# Patient Record
Sex: MALE | Race: WHITE | NOT HISPANIC OR LATINO | Employment: OTHER | URBAN - METROPOLITAN AREA
[De-identification: names, ages, dates, MRNs, and addresses within clinical notes are randomized per-mention and may not be internally consistent; named-entity substitution may affect disease eponyms.]

---

## 2022-01-05 ENCOUNTER — HOSPITAL ENCOUNTER (INPATIENT)
Facility: HOSPITAL | Age: 67
LOS: 2 days | Discharge: HOME | DRG: 308 | End: 2022-01-08
Attending: EMERGENCY MEDICINE | Admitting: INTERNAL MEDICINE
Payer: MEDICARE

## 2022-01-05 DIAGNOSIS — I48.91 ATRIAL FIBRILLATION WITH RVR: Primary | ICD-10-CM

## 2022-01-05 DIAGNOSIS — R07.9 CHEST PAIN: ICD-10-CM

## 2022-01-05 PROBLEM — U07.1 COVID-19: Status: ACTIVE | Noted: 2022-01-05

## 2022-01-05 PROBLEM — I10 PRIMARY HYPERTENSION: Status: ACTIVE | Noted: 2022-01-05

## 2022-01-05 LAB
ALBUMIN SERPL BCP-MCNC: 4.4 G/DL (ref 3.5–5.2)
ALP SERPL-CCNC: 54 U/L (ref 55–135)
ALT SERPL W/O P-5'-P-CCNC: 26 U/L (ref 10–44)
ANION GAP SERPL CALC-SCNC: 13 MMOL/L (ref 8–16)
AST SERPL-CCNC: 31 U/L (ref 10–40)
BASOPHILS # BLD AUTO: 0.02 K/UL (ref 0–0.2)
BASOPHILS NFR BLD: 0.4 % (ref 0–1.9)
BILIRUB SERPL-MCNC: 0.8 MG/DL (ref 0.1–1)
BNP SERPL-MCNC: 231 PG/ML (ref 0–99)
BUN SERPL-MCNC: 16 MG/DL (ref 8–23)
CALCIUM SERPL-MCNC: 9.1 MG/DL (ref 8.7–10.5)
CHLORIDE SERPL-SCNC: 100 MMOL/L (ref 95–110)
CK MB SERPL-MCNC: 1.3 NG/ML (ref 0.1–6.5)
CK SERPL-CCNC: 56 U/L (ref 20–200)
CO2 SERPL-SCNC: 25 MMOL/L (ref 23–29)
CREAT SERPL-MCNC: 1 MG/DL (ref 0.5–1.4)
D DIMER PPP IA.FEU-MCNC: 0.52 UG/ML FEU
DIFFERENTIAL METHOD: ABNORMAL
EOSINOPHIL # BLD AUTO: 0.1 K/UL (ref 0–0.5)
EOSINOPHIL NFR BLD: 1 % (ref 0–8)
ERYTHROCYTE [DISTWIDTH] IN BLOOD BY AUTOMATED COUNT: 12.1 % (ref 11.5–14.5)
EST. GFR  (AFRICAN AMERICAN): >60 ML/MIN/1.73 M^2
EST. GFR  (NON AFRICAN AMERICAN): >60 ML/MIN/1.73 M^2
GLUCOSE SERPL-MCNC: 124 MG/DL (ref 70–110)
HCT VFR BLD AUTO: 47.8 % (ref 40–54)
HGB BLD-MCNC: 16.2 G/DL (ref 14–18)
IMM GRANULOCYTES # BLD AUTO: 0.01 K/UL (ref 0–0.04)
IMM GRANULOCYTES NFR BLD AUTO: 0.2 % (ref 0–0.5)
INR PPP: 1.1
LYMPHOCYTES # BLD AUTO: 1 K/UL (ref 1–4.8)
LYMPHOCYTES NFR BLD: 20.9 % (ref 18–48)
MAGNESIUM SERPL-MCNC: 2 MG/DL (ref 1.6–2.6)
MCH RBC QN AUTO: 32.1 PG (ref 27–31)
MCHC RBC AUTO-ENTMCNC: 33.9 G/DL (ref 32–36)
MCV RBC AUTO: 95 FL (ref 82–98)
MONOCYTES # BLD AUTO: 0.7 K/UL (ref 0.3–1)
MONOCYTES NFR BLD: 13.3 % (ref 4–15)
NEUTROPHILS # BLD AUTO: 3.1 K/UL (ref 1.8–7.7)
NEUTROPHILS NFR BLD: 64.2 % (ref 38–73)
NRBC BLD-RTO: 0 /100 WBC
PLATELET # BLD AUTO: 185 K/UL (ref 150–450)
PMV BLD AUTO: 9.2 FL (ref 9.2–12.9)
POTASSIUM SERPL-SCNC: 3.9 MMOL/L (ref 3.5–5.1)
PROT SERPL-MCNC: 7.8 G/DL (ref 6–8.4)
PROTHROMBIN TIME: 13.1 SEC (ref 11.4–13.7)
RBC # BLD AUTO: 5.04 M/UL (ref 4.6–6.2)
SARS-COV-2 RDRP RESP QL NAA+PROBE: POSITIVE
SODIUM SERPL-SCNC: 138 MMOL/L (ref 136–145)
TROPONIN I SERPL DL<=0.01 NG/ML-MCNC: <0.03 NG/ML
TSH SERPL DL<=0.005 MIU/L-ACNC: 1.23 UIU/ML (ref 0.34–5.6)
WBC # BLD AUTO: 4.89 K/UL (ref 3.9–12.7)

## 2022-01-05 PROCEDURE — 96372 THER/PROPH/DIAG INJ SC/IM: CPT | Mod: 59

## 2022-01-05 PROCEDURE — 82550 ASSAY OF CK (CPK): CPT | Performed by: EMERGENCY MEDICINE

## 2022-01-05 PROCEDURE — 84443 ASSAY THYROID STIM HORMONE: CPT | Performed by: INTERNAL MEDICINE

## 2022-01-05 PROCEDURE — G0378 HOSPITAL OBSERVATION PER HR: HCPCS

## 2022-01-05 PROCEDURE — 83735 ASSAY OF MAGNESIUM: CPT | Performed by: EMERGENCY MEDICINE

## 2022-01-05 PROCEDURE — 96376 TX/PRO/DX INJ SAME DRUG ADON: CPT

## 2022-01-05 PROCEDURE — 84484 ASSAY OF TROPONIN QUANT: CPT | Performed by: STUDENT IN AN ORGANIZED HEALTH CARE EDUCATION/TRAINING PROGRAM

## 2022-01-05 PROCEDURE — 85379 FIBRIN DEGRADATION QUANT: CPT | Performed by: EMERGENCY MEDICINE

## 2022-01-05 PROCEDURE — 85610 PROTHROMBIN TIME: CPT | Performed by: STUDENT IN AN ORGANIZED HEALTH CARE EDUCATION/TRAINING PROGRAM

## 2022-01-05 PROCEDURE — 83880 ASSAY OF NATRIURETIC PEPTIDE: CPT | Performed by: STUDENT IN AN ORGANIZED HEALTH CARE EDUCATION/TRAINING PROGRAM

## 2022-01-05 PROCEDURE — 93010 EKG 12-LEAD: ICD-10-PCS | Mod: ,,, | Performed by: INTERNAL MEDICINE

## 2022-01-05 PROCEDURE — 96366 THER/PROPH/DIAG IV INF ADDON: CPT

## 2022-01-05 PROCEDURE — 99291 CRITICAL CARE FIRST HOUR: CPT

## 2022-01-05 PROCEDURE — 82553 CREATINE MB FRACTION: CPT | Performed by: EMERGENCY MEDICINE

## 2022-01-05 PROCEDURE — 93005 ELECTROCARDIOGRAM TRACING: CPT | Performed by: INTERNAL MEDICINE

## 2022-01-05 PROCEDURE — 63600175 PHARM REV CODE 636 W HCPCS: Performed by: EMERGENCY MEDICINE

## 2022-01-05 PROCEDURE — 25500020 PHARM REV CODE 255: Performed by: EMERGENCY MEDICINE

## 2022-01-05 PROCEDURE — 96365 THER/PROPH/DIAG IV INF INIT: CPT

## 2022-01-05 PROCEDURE — 85025 COMPLETE CBC W/AUTO DIFF WBC: CPT | Performed by: STUDENT IN AN ORGANIZED HEALTH CARE EDUCATION/TRAINING PROGRAM

## 2022-01-05 PROCEDURE — U0002 COVID-19 LAB TEST NON-CDC: HCPCS | Performed by: EMERGENCY MEDICINE

## 2022-01-05 PROCEDURE — 93010 ELECTROCARDIOGRAM REPORT: CPT | Mod: ,,, | Performed by: INTERNAL MEDICINE

## 2022-01-05 PROCEDURE — 96374 THER/PROPH/DIAG INJ IV PUSH: CPT

## 2022-01-05 PROCEDURE — 25000003 PHARM REV CODE 250: Performed by: EMERGENCY MEDICINE

## 2022-01-05 PROCEDURE — 80053 COMPREHEN METABOLIC PANEL: CPT | Performed by: STUDENT IN AN ORGANIZED HEALTH CARE EDUCATION/TRAINING PROGRAM

## 2022-01-05 PROCEDURE — 36415 COLL VENOUS BLD VENIPUNCTURE: CPT | Performed by: INTERNAL MEDICINE

## 2022-01-05 RX ORDER — POTASSIUM CHLORIDE 20 MEQ/1
40 TABLET, EXTENDED RELEASE ORAL
Status: DISCONTINUED | OUTPATIENT
Start: 2022-01-05 | End: 2022-01-08 | Stop reason: HOSPADM

## 2022-01-05 RX ORDER — POTASSIUM CHLORIDE 7.45 MG/ML
20 INJECTION INTRAVENOUS
Status: DISCONTINUED | OUTPATIENT
Start: 2022-01-05 | End: 2022-01-08 | Stop reason: HOSPADM

## 2022-01-05 RX ORDER — POTASSIUM CHLORIDE 7.45 MG/ML
40 INJECTION INTRAVENOUS
Status: DISCONTINUED | OUTPATIENT
Start: 2022-01-05 | End: 2022-01-08 | Stop reason: HOSPADM

## 2022-01-05 RX ORDER — ENOXAPARIN SODIUM 100 MG/ML
1 INJECTION SUBCUTANEOUS
Status: COMPLETED | OUTPATIENT
Start: 2022-01-05 | End: 2022-01-05

## 2022-01-05 RX ORDER — POTASSIUM CHLORIDE 20 MEQ/1
20 TABLET, EXTENDED RELEASE ORAL
Status: DISCONTINUED | OUTPATIENT
Start: 2022-01-05 | End: 2022-01-08 | Stop reason: HOSPADM

## 2022-01-05 RX ORDER — LANOLIN ALCOHOL/MO/W.PET/CERES
800 CREAM (GRAM) TOPICAL
Status: DISCONTINUED | OUTPATIENT
Start: 2022-01-05 | End: 2022-01-08 | Stop reason: HOSPADM

## 2022-01-05 RX ORDER — MAGNESIUM SULFATE 1 G/100ML
1 INJECTION INTRAVENOUS
Status: DISCONTINUED | OUTPATIENT
Start: 2022-01-05 | End: 2022-01-08 | Stop reason: HOSPADM

## 2022-01-05 RX ORDER — MAGNESIUM SULFATE HEPTAHYDRATE 40 MG/ML
2 INJECTION, SOLUTION INTRAVENOUS
Status: DISCONTINUED | OUTPATIENT
Start: 2022-01-05 | End: 2022-01-08 | Stop reason: HOSPADM

## 2022-01-05 RX ORDER — MAGNESIUM SULFATE HEPTAHYDRATE 40 MG/ML
4 INJECTION, SOLUTION INTRAVENOUS
Status: DISCONTINUED | OUTPATIENT
Start: 2022-01-05 | End: 2022-01-08 | Stop reason: HOSPADM

## 2022-01-05 RX ORDER — DILTIAZEM HYDROCHLORIDE 5 MG/ML
10 INJECTION INTRAVENOUS
Status: COMPLETED | OUTPATIENT
Start: 2022-01-05 | End: 2022-01-05

## 2022-01-05 RX ADMIN — ENOXAPARIN SODIUM 70 MG: 80 INJECTION SUBCUTANEOUS at 03:01

## 2022-01-05 RX ADMIN — IOHEXOL 70 ML: 350 INJECTION, SOLUTION INTRAVENOUS at 05:01

## 2022-01-05 RX ADMIN — DILTIAZEM HYDROCHLORIDE 10 MG: 5 INJECTION INTRAVENOUS at 03:01

## 2022-01-05 RX ADMIN — DEXTROSE MONOHYDRATE 5 MG/HR: 50 INJECTION, SOLUTION INTRAVENOUS at 03:01

## 2022-01-05 NOTE — ED PROVIDER NOTES
Encounter Date: 1/5/2022       History     Chief Complaint   Patient presents with    Palpitations     Pt having palpitations, sent from urgent care     66-year-old male with history of hypertension.  Patient presents emergency department with complaint of COVID like symptoms since 12/31.  Patient states has had nonproductive cough, headache, diarrhea with associated shortness of breath.  Patient was seen at [facility] urgent care earlier today and had tested positive for COVID.  While at urgent care patient found to have atrial fibrillation rapid ventricular response with rates in the 170s was told to come to the emergency department further evaluation treatment.  Patient denied chest pain, no nausea, vomiting, no other constitutional symptoms.        Review of patient's allergies indicates:  No Known Allergies  No past medical history on file.  No past surgical history on file.  No family history on file.     Review of Systems   Constitutional: Positive for fatigue and fever.   HENT: Negative for sore throat.    Respiratory: Positive for cough and shortness of breath.    Cardiovascular: Positive for palpitations. Negative for chest pain.   Gastrointestinal: Negative for nausea and vomiting.   Genitourinary: Negative for dysuria.   Musculoskeletal: Negative for arthralgias, back pain and myalgias.   Skin: Negative for rash.   Neurological: Positive for headaches. Negative for weakness.   Hematological: Does not bruise/bleed easily.       Physical Exam     Initial Vitals [01/05/22 1458]   BP Pulse Resp Temp SpO2   (!) 141/112 (!) 166 18 98.4 °F (36.9 °C) 98 %      MAP       --         Physical Exam    Nursing note and vitals reviewed.  Constitutional: He appears well-developed and well-nourished.   HENT:   Head: Normocephalic and atraumatic.   Nose: Nose normal.   Mouth/Throat: Oropharynx is clear and moist.   Eyes: Conjunctivae and EOM are normal. Pupils are equal, round, and reactive to light. No scleral icterus.    Neck: Neck supple.   Normal range of motion.  Cardiovascular: Regular rhythm, normal heart sounds and intact distal pulses. Exam reveals no gallop and no friction rub.    No murmur heard.  Tachycardia, irregular, irregular   Pulmonary/Chest: No stridor. No respiratory distress.   Course bilateral breath sounds no adventitious sounds   Abdominal: Abdomen is soft. Bowel sounds are normal. He exhibits no mass. There is no abdominal tenderness. There is no rebound and no guarding.   Musculoskeletal:         General: No edema. Normal range of motion.      Cervical back: Normal range of motion and neck supple.     Lymphadenopathy:     He has no cervical adenopathy.   Neurological: He is alert and oriented to person, place, and time. He has normal strength and normal reflexes. No cranial nerve deficit or sensory deficit. GCS score is 15. GCS eye subscore is 4. GCS verbal subscore is 5. GCS motor subscore is 6.   Skin: Skin is warm and dry. Capillary refill takes less than 2 seconds. No rash noted.   Psychiatric: He has a normal mood and affect. His behavior is normal. Judgment and thought content normal.         ED Course   Procedures  Labs Reviewed   CBC W/ AUTO DIFFERENTIAL - Abnormal; Notable for the following components:       Result Value    MCH 32.1 (*)     All other components within normal limits   COMPREHENSIVE METABOLIC PANEL - Abnormal; Notable for the following components:    Glucose 124 (*)     Alkaline Phosphatase 54 (*)     All other components within normal limits   B-TYPE NATRIURETIC PEPTIDE - Abnormal; Notable for the following components:     (*)     All other components within normal limits   SARS-COV-2 RNA AMPLIFICATION, QUAL - Abnormal; Notable for the following components:    SARS-CoV-2 RNA, Amplification, Qual Positive (*)     All other components within normal limits   D DIMER, QUANTITATIVE - Abnormal; Notable for the following components:    D-Dimer 0.52 (*)     All other components  within normal limits    Narrative:     ddimt critical result(s) repeated. Called and verbal readback   obtained from Jessica Forman RN by LS1 01/05/2022 16:07   TROPONIN I   PROTIME-INR   CK   CK-MB   MAGNESIUM   TSH   TSH        ECG Results          EKG 12-lead (In process)  Result time 01/05/22 16:32:21    In process by Interface, Lab In [facility] (01/05/22 16:32:21)                 Narrative:    Test Reason : R07.9,    Vent. Rate : 167 BPM     Atrial Rate : 000 BPM     P-R Int : 000 ms          QRS Dur : 074 ms      QT Int : 246 ms       P-R-T Axes : 000 085 -16 degrees     QTc Int : 410 ms    Atrial fibrillation with rapid ventricular response  Cannot rule out Anterior infarct ,age undetermined  Abnormal ECG  No previous ECGs available    Referred By: [email or web address]   SELF           Confirmed By:                             Imaging Results          CTA Chest Non-Coronary (PE Study) (Final result)  Result time 01/05/22 18:19:36    Final result by Umer Sam MD (01/05/22 18:19:36)                 Narrative:    CMS MANDATED QUALITY DATA - CT RADIATION - 436    All CT scans at this facility utilize dose modulation, iterative reconstruction, and/or weight based dosing when appropriate to reduce radiation dose to as low as reasonably achievable.        Reason: Pulmonary embolism (PE) suspected, positive D-dimer Elevated D-Dimer, SOB, COVID+    TECHNIQUE: CT angiography of thorax with 70 mL Omnipaque 350.  Maximum intensity projection coronal reformations were created at a separate workstation and stored in the patient's permanent medical record.    COMPARISON: None    CTA THORAX:  Negative for pulmonary embolus.    Heart is slightly enlarged with coronary artery calcifications. Thoracic aorta is of normal caliber and without dissection.    Minimal patchy centrilobular groundglass opacity occurs in right lower lobe (for example series 4 image 151). Lungs are otherwise clear. Trachea and main bronchi are patent. No pleural  or pericardial effusion. No enlarged hilar, mediastinal, or axillary lymph nodes.    Visualized upper abdomen is unremarkable.    Degenerative changes affect the spine. No acute osseous abnormality.    IMPRESSION:    1. Negative for pulmonary embolus.  2. Coronary artery calcification and mild cardiomegaly.  3. Minimal patchy centrilobular groundglass opacity in right right lower lobe focally could represent minimal infectious or inflammatory bronchiolitis or alveolitis. Given history, this could reflect minimal parenchymal change related to reported Covid positivity.    Electronically signed by:  Umer Sam MD  1/5/2022 6:19 PM CST Workstation: [id number]                             X-Ray Chest AP Portable (Final result)  Result time 01/05/22 15:47:10    Final result by Eufemia Waters MD (01/05/22 15:47:10)                 Narrative:    XR CHEST 1 VIEW    CLINICAL HISTORY:  66 years Male PALPITATIONS    COMPARISON: None    FINDINGS: Cardiomediastinal silhouette is within normal limits. Lungs are normally expanded with no airspace consolidation. No pleural effusion or pneumothorax. No acute osseous abnormality.    IMPRESSION: No acute pulmonary process.    Electronically signed by:  Eufemia Waters MD  1/5/2022 3:47 PM CST Workstation: [id number]                               Medications   diltiaZEM 100 mg in dextrose 5% 100 mL IVPB (ready to mix system) (titrating) (10 mg/hr Intravenous Rate/Dose Change 1/5/22 1601)   potassium chloride SA CR tablet 20 mEq (has no administration in time range)   potassium chloride SA CR tablet 40 mEq (has no administration in time range)   potassium chloride SA CR tablet 20 mEq (has no administration in time range)   potassium chloride SA CR tablet 40 mEq (has no administration in time range)   potassium chloride 10 mEq in 100 mL IVPB (has no administration in time range)   potassium chloride 10 mEq in 100 mL IVPB (has no administration in time range)   potassium chloride  10 mEq in 100 mL IVPB (has no administration in time range)   potassium chloride 10 mEq in 100 mL IVPB (has no administration in time range)   magnesium oxide tablet 800 mg (has no administration in time range)   magnesium sulfate 2g in water 50mL IVPB (premix) (has no administration in time range)   magnesium sulfate in dextrose IVPB (premix) 1 g (has no administration in time range)   magnesium sulfate 2g in water 50mL IVPB (premix) (has no administration in time range)   magnesium sulfate 2g in water 50mL IVPB (premix) (has no administration in time range)   calcium chloride 1 g in dextrose 5 % 100 mL IVPB (has no administration in time range)   calcium chloride 1 g in dextrose 5 % 100 mL IVPB (has no administration in time range)   calcium chloride 1 g in dextrose 5 % 100 mL IVPB (has no administration in time range)   diltiaZEM injection 10 mg (10 mg Intravenous Given 1/5/22 1519)   enoxaparin injection 70 mg (70 mg Subcutaneous Given 1/5/22 1535)   iohexoL (OMNIPAQUE 350) injection 70 mL (70 mLs Intravenous Given 1/5/22 1752)     Medical Decision Making:   Initial Assessment:   66-year-old male with history of hypertension.  Patient presents emergency department with complaint of COVID like symptoms since 12/31.  Patient states has had nonproductive cough, headache, diarrhea with associated shortness of breath.  Patient was seen at [facility] urgent care earlier today and had tested positive for COVID.  While at urgent care patient found to have atrial fibrillation rapid ventricular response with rates in the 170s was told to come to the emergency department further evaluation treatment.  Patient denied chest pain, no nausea, vomiting, no other constitutional symptoms.        Differential Diagnosis:   Atrial fibrillation rapid ventricular response, cardiac arrhythmia, pulmonary embolus,  Clinical Tests:   Lab Tests: Ordered and Reviewed  Radiological Study: Ordered and Reviewed  Medical Tests: Ordered and  Reviewed            Attending Attestation:         Attending Critical Care:   Critical Care Times:   Direct Patient Care (initial evaluation, reassessments, and time considering the case)................................................................30 minutes.   Additional History from reviewing old medical records or taking additional history from the family, EMS, PCP, etc.......................10 minutes.   Ordering, Reviewing, and Interpreting Diagnostic Studies...............................................................................................................10 minutes.   Documentation..................................................................................................................................................................................10 minutes.   Consultation with other Physicians. .................................................................................................................................................10 minutes.   ==============================================================  · Total Critical Care Time - exclusive of procedural time: 70 minutes.  ==============================================================  Critical care was necessary to treat or prevent imminent or life-threatening deterioration of the following conditions: cardiac arrhythmia.   Critical care was time spent personally by me on the following activities: obtaining history from patient or relative, examination of patient, review of x-rays / CT sent with the patient, review of old charts, ordering lab, x-rays, and/or EKG, development of treatment plan with patient or relative, ordering and performing treatments and interventions, evaluation of patient's response to treatment, discussions with primary provider, interpretation of cardiac measurements and re-evaluation of patient's conition.   Critical Care Condition: potentially life-threatening                    Clinical Impression:    Final diagnoses:  [R07.9] Chest pain  [I48.91] Atrial fibrillation with RVR          ED Disposition Condition    Observation               Augustine Schwarz MD  01/06/22 0042

## 2022-01-06 ENCOUNTER — CLINICAL SUPPORT (OUTPATIENT)
Dept: CARDIOLOGY | Facility: HOSPITAL | Age: 67
DRG: 308 | End: 2022-01-06
Attending: INTERNAL MEDICINE
Payer: MEDICARE

## 2022-01-06 VITALS — BODY MASS INDEX: 20.9 KG/M2 | HEIGHT: 70 IN | WEIGHT: 146 LBS

## 2022-01-06 LAB
ALBUMIN SERPL BCP-MCNC: 3.6 G/DL (ref 3.5–5.2)
ALP SERPL-CCNC: 43 U/L (ref 55–135)
ALT SERPL W/O P-5'-P-CCNC: 23 U/L (ref 10–44)
ANION GAP SERPL CALC-SCNC: 12 MMOL/L (ref 8–16)
AORTIC ROOT ANNULUS: 2.49 CM
AORTIC VALVE CUSP SEPERATION: 1.89 CM
AST SERPL-CCNC: 25 U/L (ref 10–40)
AV INDEX (PROSTH): 0.79
AV MEAN GRADIENT: 2 MMHG
AV PEAK GRADIENT: 4 MMHG
AV VALVE AREA: 2.57 CM2
AV VELOCITY RATIO: 86.42
BILIRUB SERPL-MCNC: 1.1 MG/DL (ref 0.1–1)
BSA FOR ECHO PROCEDURE: 1.81 M2
BUN SERPL-MCNC: 15 MG/DL (ref 8–23)
CALCIUM SERPL-MCNC: 8.6 MG/DL (ref 8.7–10.5)
CHLORIDE SERPL-SCNC: 105 MMOL/L (ref 95–110)
CO2 SERPL-SCNC: 24 MMOL/L (ref 23–29)
CREAT SERPL-MCNC: 0.8 MG/DL (ref 0.5–1.4)
CV ECHO LV RWT: 0.4 CM
DOP CALC AO PEAK VEL: 0.99 M/S
DOP CALC AO VTI: 16.71 CM
DOP CALC LVOT AREA: 3.2 CM2
DOP CALC LVOT DIAMETER: 2.03 CM
DOP CALC LVOT PEAK VEL: 85.56 M/S
DOP CALC LVOT STROKE VOLUME: 42.93 CM3
DOP CALCLVOT PEAK VEL VTI: 13.27 CM
E WAVE DECELERATION TIME: 179.84 MSEC
E/E' RATIO: 9.83 M/S
ECHO LV POSTERIOR WALL: 0.91 CM (ref 0.6–1.1)
EJECTION FRACTION: 60 %
ERYTHROCYTE [DISTWIDTH] IN BLOOD BY AUTOMATED COUNT: 12.1 % (ref 11.5–14.5)
EST. GFR  (AFRICAN AMERICAN): >60 ML/MIN/1.73 M^2
EST. GFR  (NON AFRICAN AMERICAN): >60 ML/MIN/1.73 M^2
FRACTIONAL SHORTENING: 28 % (ref 28–44)
GLUCOSE SERPL-MCNC: 88 MG/DL (ref 70–110)
HCT VFR BLD AUTO: 43.4 % (ref 40–54)
HGB BLD-MCNC: 14.9 G/DL (ref 14–18)
INTERVENTRICULAR SEPTUM: 0.9 CM (ref 0.6–1.1)
IVRT: 91.17 MSEC
LEFT INTERNAL DIMENSION IN SYSTOLE: 3.31 CM (ref 2.1–4)
LEFT VENTRICLE MASS INDEX: 75 G/M2
LEFT VENTRICULAR INTERNAL DIMENSION IN DIASTOLE: 4.58 CM (ref 3.5–6)
LEFT VENTRICULAR MASS: 137.75 G
LV LATERAL E/E' RATIO: 10.27 M/S
LV SEPTAL E/E' RATIO: 9.42 M/S
MAGNESIUM SERPL-MCNC: 2.1 MG/DL (ref 1.6–2.6)
MCH RBC QN AUTO: 32.1 PG (ref 27–31)
MCHC RBC AUTO-ENTMCNC: 34.3 G/DL (ref 32–36)
MCV RBC AUTO: 94 FL (ref 82–98)
MV PEAK E VEL: 1.13 M/S
PISA TR MAX VEL: 2.52 M/S
PLATELET # BLD AUTO: 167 K/UL (ref 150–450)
PMV BLD AUTO: 9 FL (ref 9.2–12.9)
POTASSIUM SERPL-SCNC: 3.8 MMOL/L (ref 3.5–5.1)
PROT SERPL-MCNC: 6.4 G/DL (ref 6–8.4)
PV PEAK VELOCITY: 70.89 CM/S
RA PRESSURE: 15 MMHG
RBC # BLD AUTO: 4.64 M/UL (ref 4.6–6.2)
RIGHT VENTRICULAR END-DIASTOLIC DIMENSION: 452 CM
SODIUM SERPL-SCNC: 141 MMOL/L (ref 136–145)
TDI LATERAL: 0.11 M/S
TDI SEPTAL: 0.12 M/S
TDI: 0.12 M/S
TR MAX PG: 25 MMHG
TV REST PULMONARY ARTERY PRESSURE: 40 MMHG
WBC # BLD AUTO: 4.1 K/UL (ref 3.9–12.7)

## 2022-01-06 PROCEDURE — 99900035 HC TECH TIME PER 15 MIN (STAT)

## 2022-01-06 PROCEDURE — 83735 ASSAY OF MAGNESIUM: CPT | Performed by: INTERNAL MEDICINE

## 2022-01-06 PROCEDURE — 99223 PR INITIAL HOSPITAL CARE,LEVL III: ICD-10-PCS | Mod: ,,, | Performed by: GENERAL PRACTICE

## 2022-01-06 PROCEDURE — 21000000 HC CCU ICU ROOM CHARGE

## 2022-01-06 PROCEDURE — 94761 N-INVAS EAR/PLS OXIMETRY MLT: CPT

## 2022-01-06 PROCEDURE — 93306 ECHO (CUPID ONLY): ICD-10-PCS | Mod: 26,,, | Performed by: INTERNAL MEDICINE

## 2022-01-06 PROCEDURE — 96366 THER/PROPH/DIAG IV INF ADDON: CPT

## 2022-01-06 PROCEDURE — 36415 COLL VENOUS BLD VENIPUNCTURE: CPT | Performed by: INTERNAL MEDICINE

## 2022-01-06 PROCEDURE — 93306 TTE W/DOPPLER COMPLETE: CPT | Mod: 26,,, | Performed by: INTERNAL MEDICINE

## 2022-01-06 PROCEDURE — 99223 1ST HOSP IP/OBS HIGH 75: CPT | Mod: ,,, | Performed by: GENERAL PRACTICE

## 2022-01-06 PROCEDURE — 25000003 PHARM REV CODE 250: Performed by: INTERNAL MEDICINE

## 2022-01-06 PROCEDURE — 80053 COMPREHEN METABOLIC PANEL: CPT | Performed by: INTERNAL MEDICINE

## 2022-01-06 PROCEDURE — 93306 TTE W/DOPPLER COMPLETE: CPT

## 2022-01-06 PROCEDURE — 63600175 PHARM REV CODE 636 W HCPCS: Performed by: INTERNAL MEDICINE

## 2022-01-06 PROCEDURE — 25000003 PHARM REV CODE 250

## 2022-01-06 PROCEDURE — 85027 COMPLETE CBC AUTOMATED: CPT | Performed by: INTERNAL MEDICINE

## 2022-01-06 RX ORDER — CHLORHEXIDINE GLUCONATE ORAL RINSE 1.2 MG/ML
15 SOLUTION DENTAL 2 TIMES DAILY
Status: DISCONTINUED | OUTPATIENT
Start: 2022-01-06 | End: 2022-01-08 | Stop reason: HOSPADM

## 2022-01-06 RX ORDER — GLUCAGON 1 MG
1 KIT INJECTION
Status: DISCONTINUED | OUTPATIENT
Start: 2022-01-06 | End: 2022-01-08 | Stop reason: HOSPADM

## 2022-01-06 RX ORDER — IBUPROFEN 200 MG
16 TABLET ORAL
Status: DISCONTINUED | OUTPATIENT
Start: 2022-01-06 | End: 2022-01-08 | Stop reason: HOSPADM

## 2022-01-06 RX ORDER — IBUPROFEN 200 MG
24 TABLET ORAL
Status: DISCONTINUED | OUTPATIENT
Start: 2022-01-06 | End: 2022-01-08 | Stop reason: HOSPADM

## 2022-01-06 RX ORDER — ACETAMINOPHEN 325 MG/1
650 TABLET ORAL EVERY 8 HOURS PRN
Status: DISCONTINUED | OUTPATIENT
Start: 2022-01-06 | End: 2022-01-08 | Stop reason: HOSPADM

## 2022-01-06 RX ORDER — GUAIFENESIN/DEXTROMETHORPHAN 100-10MG/5
10 SYRUP ORAL EVERY 4 HOURS PRN
Status: DISCONTINUED | OUTPATIENT
Start: 2022-01-06 | End: 2022-01-08 | Stop reason: HOSPADM

## 2022-01-06 RX ORDER — SODIUM CHLORIDE 0.9 % (FLUSH) 0.9 %
10 SYRINGE (ML) INJECTION
Status: DISCONTINUED | OUTPATIENT
Start: 2022-01-06 | End: 2022-01-08 | Stop reason: HOSPADM

## 2022-01-06 RX ORDER — ENOXAPARIN SODIUM 100 MG/ML
1 INJECTION SUBCUTANEOUS 2 TIMES DAILY
Status: DISCONTINUED | OUTPATIENT
Start: 2022-01-06 | End: 2022-01-08 | Stop reason: HOSPADM

## 2022-01-06 RX ORDER — MUPIROCIN 20 MG/G
OINTMENT TOPICAL 2 TIMES DAILY
Status: DISCONTINUED | OUTPATIENT
Start: 2022-01-06 | End: 2022-01-08 | Stop reason: HOSPADM

## 2022-01-06 RX ADMIN — ENOXAPARIN SODIUM 70 MG: 80 INJECTION SUBCUTANEOUS at 08:01

## 2022-01-06 RX ADMIN — CHLORHEXIDINE GLUCONATE 15 ML: 1.2 RINSE ORAL at 08:01

## 2022-01-06 RX ADMIN — DEXTROSE MONOHYDRATE 15 MG/HR: 50 INJECTION, SOLUTION INTRAVENOUS at 12:01

## 2022-01-06 RX ADMIN — MUPIROCIN: 20 OINTMENT TOPICAL at 08:01

## 2022-01-06 RX ADMIN — ENOXAPARIN SODIUM 70 MG: 80 INJECTION SUBCUTANEOUS at 10:01

## 2022-01-06 RX ADMIN — DEXTROSE MONOHYDRATE 5 MG/HR: 50 INJECTION, SOLUTION INTRAVENOUS at 09:01

## 2022-01-06 NOTE — HPI
Patient is a 66-year-old  male with known history of essential hypertension who was sent to the ED from Urgent Care for new-onset atrial fibrillation with RVR.      Patient reports being in his usual state of health until New Year's Kelsie when he noticed symptoms suggestive of COVID-19 which include exertional shortness of breath, nonproductive cough, fatigue, abdominal pain and diarrhea.  These symptoms have subsided since.  He is currently asymptomatic except for mild fatigue.  He went to get tested at an urgent care to check if he was negative.  However he was noted to be in atrial fibrillation with RVR with heart rate in 170s and was sent here.  Patient denies chest pain, palpitations, lightheadedness/dizziness or shortness of breath.    Patient reports getting his booster COVID-19 Pfizer vaccine 2 days ago (01/03).  He already has been vaccinated with Moderna vaccine in February/March of 2021. Patient strongly believes that his atrial fibrillation was secondary to the vaccine.  No cardiac symptoms following Moderna vaccine.    Patient is on amlodipine 10 mg and olmesartan 40 mg daily for essential hypertension.  Reports compliance with medications.    Rest of the 10 point review of systems is negative except as mentioned above.

## 2022-01-06 NOTE — H&P
[facility] - Emergency Dept  Hospital Medicine  History & Physical    Patient Name: Les Goldman  MRN: [id number]  Patient Class: OP- Observation  Admission Date: 1/5/2022  Attending Physician: Dimitrios Domingo MD  Primary Care Provider: Primary Doctor No         Patient information was obtained from patient and ER records.     Subjective:     Principal Problem:Atrial fibrillation with RVR    Chief Complaint:   Chief Complaint   Patient presents with    Palpitations     Pt having palpitations, sent from urgent care        HPI: Patient is a 66-year-old  male with known history of essential hypertension who was sent to the ED from Urgent Care for new-onset atrial fibrillation with RVR.      Patient reports being in his usual state of health until New Year's Kelsie when he noticed symptoms suggestive of COVID-19 which include exertional shortness of breath, nonproductive cough, fatigue, abdominal pain and diarrhea.  These symptoms have subsided since.  He is currently asymptomatic except for mild fatigue.  He went to get tested at an urgent care to check if he was negative.  However he was noted to be in atrial fibrillation with RVR with heart rate in 170s and was sent here.  Patient denies chest pain, palpitations, lightheadedness/dizziness or shortness of breath.    Patient reports getting his booster COVID-19 Pfizer vaccine 2 days ago (01/03).  He already has been vaccinated with Moderna vaccine in February/March of 2021. Patient strongly believes that his atrial fibrillation was secondary to the vaccine.  No cardiac symptoms following Moderna vaccine.    Patient is on amlodipine 10 mg and olmesartan 40 mg daily for essential hypertension.  Reports compliance with medications.    Rest of the 10 point review of systems is negative except as mentioned above.        Past medical history:  Notable for essential hypertension  Past surgical history:  Reviewed.  Noncontributory  Family history:   Reviewed.  Positive for essential hypertension  Social history:  Former smoker.      Objective:     Vital Signs (Most Recent):  Temp: 98.4 °F (36.9 °C) (01/05/22 1458)  Pulse: 95 (01/05/22 1941)  Resp: 15 (01/05/22 1858)  BP: (!) 135/98 (01/05/22 1941)  SpO2: 97 % (01/05/22 1941) Vital Signs (24h Range):  Temp:  [98.4 °F (36.9 °C)] 98.4 °F (36.9 °C)  Pulse:  [] 95  Resp:  [13-19] 15  SpO2:  [97 %-99 %] 97 %  BP: (115-141)/() 135/98     Weight: 68 kg (150 lb)  Body mass index is 21.52 kg/m².    Physical Exam      General: Patient resting comfortably in no acute distress.  Eyes:  No conjunctival pallor. No scleral icterus.  Lungs:  No tachypnea or accessory muscle use  Cor:  Irregularly irregular.  Rate controlled.  No pedal edema.  Abd: Soft. Nontender. No HSM.  Musculoskeletal: No arthropathy, deformity.  Skin: No rashes, swelling, or erythema.  Neuro: A&O x3. Moving all extremities equally  Ext: No clubbing. No cyanosis. Peripheral pulses +      Significant Labs:   All pertinent labs within the past 24 hours have been reviewed.  CBC:   Recent Labs   Lab 01/05/22  1527   WBC 4.89   HGB 16.2   HCT 47.8        CMP:   Recent Labs   Lab 01/05/22  1527      K 3.9      CO2 25   *   BUN 16   CREATININE 1.0   CALCIUM 9.1   PROT 7.8   ALBUMIN 4.4   BILITOT 0.8   ALKPHOS 54*   AST 31   ALT 26   ANIONGAP 13   EGFRNONAA >60.0     Cardiac Markers:   Recent Labs   Lab 01/05/22  1527   *     Troponin:   Recent Labs   Lab 01/05/22  1527   TROPONINI <0.030       Significant Imaging: I have reviewed all pertinent imaging results/findings within the past 24 hours.     Imaging Results          CTA Chest Non-Coronary (PE Study) (Final result)  Result time 01/05/22 18:19:36    Final result by Umer Sam MD (01/05/22 18:19:36)                 Narrative:    CMS MANDATED QUALITY DATA - CT RADIATION - 436    All CT scans at this facility utilize dose modulation, iterative reconstruction, and/or  weight based dosing when appropriate to reduce radiation dose to as low as reasonably achievable.        Reason: Pulmonary embolism (PE) suspected, positive D-dimer Elevated D-Dimer, SOB, COVID+    TECHNIQUE: CT angiography of thorax with 70 mL Omnipaque 350.  Maximum intensity projection coronal reformations were created at a separate workstation and stored in the patient's permanent medical record.    COMPARISON: None    CTA THORAX:  Negative for pulmonary embolus.    Heart is slightly enlarged with coronary artery calcifications. Thoracic aorta is of normal caliber and without dissection.    Minimal patchy centrilobular groundglass opacity occurs in right lower lobe (for example series 4 image 151). Lungs are otherwise clear. Trachea and main bronchi are patent. No pleural or pericardial effusion. No enlarged hilar, mediastinal, or axillary lymph nodes.    Visualized upper abdomen is unremarkable.    Degenerative changes affect the spine. No acute osseous abnormality.    IMPRESSION:    1. Negative for pulmonary embolus.  2. Coronary artery calcification and mild cardiomegaly.  3. Minimal patchy centrilobular groundglass opacity in right right lower lobe focally could represent minimal infectious or inflammatory bronchiolitis or alveolitis. Given history, this could reflect minimal parenchymal change related to reported Covid positivity.    Electronically signed by:  Umer Sam MD  1/5/2022 6:19 PM CST Workstation: [id number]                             X-Ray Chest AP Portable (Final result)  Result time 01/05/22 15:47:10    Final result by Eufemia Waters MD (01/05/22 15:47:10)                 Narrative:    XR CHEST 1 VIEW    CLINICAL HISTORY:  66 years Male PALPITATIONS    COMPARISON: None    FINDINGS: Cardiomediastinal silhouette is within normal limits. Lungs are normally expanded with no airspace consolidation. No pleural effusion or pneumothorax. No acute osseous abnormality.    IMPRESSION: No acute  pulmonary process.    Electronically signed by:  Eufemia Waters MD  1/5/2022 3:47 PM CST Workstation: [id number]                            Results for orders placed or performed during the hospital encounter of 01/05/22   EKG 12-lead    Collection Time: 01/05/22  2:55 PM    Narrative    Test Reason : R07.9,    Vent. Rate : 167 BPM     Atrial Rate : 000 BPM     P-R Int : 000 ms          QRS Dur : 074 ms      QT Int : 246 ms       P-R-T Axes : 000 085 -16 degrees     QTc Int : 410 ms    Atrial fibrillation with rapid ventricular response  Cannot rule out Anterior infarct ,age undetermined  Abnormal ECG  No previous ECGs available    Referred By: [email or web address]   SELF           Confirmed By:          Assessment/Plan:     Active Hospital Problems    Diagnosis  POA    *Atrial fibrillation with RVR [I48.91]  Yes    COVID-19 [U07.1]  Yes    Primary hypertension [I10]  Yes      Resolved Hospital Problems   No resolved problems to display.        Plan:  Atrial fibrillation with RVR likely triggered from COVID-19  [place] analysis from 2021 (Front Cardiovasc Med. 2021;8:352226. Epub 2021 Oct 13) reports atrial fibrillation prevalence of 11% in COVID-19 patients  Treat with diltiazem titrating drip for rate control  CHADS2 Vasc score is a minimum of 2 for age and hypertension.  Started on therapeutic dose enoxaparin for anticoagulation.  Switch to oral agent at the time of discharge  Elevated BNP noted.  Euvolemic on exam.  Obtain echocardiogram to evaluate for congestive heart failure and intracardiac thrombus  Holding home amlodipine and olmesartan given normal blood pressure while on diltiazem.  Adjust accordingly  Cardiology consultation for further evaluation and management  Check TSH  Patient is asymptomatic from COVID-19 and is saturating comfortably on room air.  Not a candidate for dexamethasone or remdesivir  Symptomatic management with p.r.n. bronchodilators and antitussives      VTE risk high.  SCDs and enoxaparin  (therapeutic dose as stated above)        Dimitrios Domingo MD  Department of Hospital Medicine   Critical access hospital

## 2022-01-06 NOTE — CONSULTS
[facility]  Department of Cardiology  Consult Note      PATIENT NAME: Les Goldman    MRN: [id number]  TODAY'S DATE: 01/06/2022  ADMIT DATE: 1/5/2022                          CONSULT REQUESTED BY: George Nunes MD    SUBJECTIVE     PRINCIPAL PROBLEM: Atrial fibrillation with RVR      REASON FOR CONSULT:  Atrial fibrillation RVR      HPI:  Er note   Palpitations       Pt having palpitations, sent from urgent care      66-year-old male with history of hypertension.  Patient presents emergency department with complaint of COVID like symptoms since 12/31.  Patient states has had nonproductive cough, headache, diarrhea with associated shortness of breath.  Patient was seen at [facility] urgent care earlier today and had tested positive for COVID.  While at urgent care patient found to have atrial fibrillation rapid ventricular response with rates in the 170s was told to come to the emergency department further evaluation treatment.  Patient denied chest pain, no nausea, vomiting, no other constitutional symptoms.         History is from the nurse and the chart.  Patient was previously healthy  but having some shortness of breath for more than a week.  He is now admitted with atrial fibrillation rapid response.  His rate was controlled with 80s on Cardizem drip but this was continue this morning about 4:00 a.m..  His heart rate is currently starting to elevate again up to the 140s to 160 when he moves around.        mReview of patient's allergies indicates:  No Known Allergies    No past medical history on file.  No past surgical history on file.        REVIEW OF SYSTEMS  CONSTITUTIONAL: Negative for chills, fatigue and fever.   EYES: No double vision, No blurred vision  NEURO: No headaches, No dizziness  RESPIRATORY: POS for cough, shortness of breath    CARDIOVASCULAR: Negative for chest pain.POS for palpitations and NEG leg swelling.   GI: Negative for abdominal pain, No melena, diarrhea, nausea and  vomiting.   : Negative for dysuria and frequency, Negative for hematuria  SKIN: Negative for bruising, Negative for edema or discoloration noted.   ENDOCRINE: Negative for polyphagia, Negative for heat intolerance, Negative for cold intolerance  PSYCHIATRIC: Negative for depression, Negative for anxiety, Negative for memory loss  MUSCULOSKELETAL: Negative for neck pain, Negative for muscle weakness, Negative for back pain     OBJECTIVE     VITAL SIGNS (Most Recent)  Temp: 98.4 °F (36.9 °C) (01/06/22 0301)  Pulse: 86 (01/06/22 0800)  Resp: 17 (01/06/22 0800)  BP: 110/80 (01/06/22 0800)  SpO2: 95 % (01/06/22 0800)    VENTILATION STATUS  Resp: 17 (01/06/22 0800)  SpO2: 95 % (01/06/22 0800)       I & O (Last 24H):    Intake/Output Summary (Last 24 hours) at 1/6/2022 1024  Last data filed at 1/6/2022 0801  Gross per 24 hour   Intake 120 ml   Output --   Net 120 ml       WEIGHTS  Wt Readings from Last 3 Encounters:   01/06/22 0015 66.4 kg (146 lb 6.2 oz)   01/05/22 1458 68 kg (150 lb)   01/06/22 0830 66.2 kg (146 lb)       PHYSICAL EXAM  BENIGN EXAM PER DR. SMITH  IRREGULAR IRREGULAR HEART RATE  PATIENT NOT EXAMINED SECONDARY TO POSITIVE COVID STATUS  .     HOME MEDICATIONS:  No current facility-administered medications on file prior to encounter.     No current outpatient medications on file prior to encounter.       SCHEDULED MEDS:   chlorhexidine  15 mL Mouth/Throat BID    enoxaparin  1 mg/kg Subcutaneous BID    mupirocin   Nasal BID       CONTINUOUS INFUSIONS:   dilTIAZem Stopped (01/06/22 0200)       PRN MEDS:acetaminophen, calcium chloride IVPB, calcium chloride IVPB, calcium chloride IVPB, dextromethorphan-guaiFENesin  mg/5 ml, dextrose 50%, dextrose 50%, glucagon (human recombinant), glucose, glucose, magnesium oxide, magnesium sulfate IVPB, magnesium sulfate IVPB, magnesium sulfate IVPB, magnesium sulfate IVPB, potassium chloride in water, potassium chloride in water, potassium chloride in water,  potassium chloride in water, potassium chloride, potassium chloride, potassium chloride, potassium chloride, sodium chloride 0.9%    LABS AND DIAGNOSTICS     CBC LAST 3 DAYS  Recent Labs   Lab 01/05/22  1527 01/06/22  0454   WBC 4.89 4.10   RBC 5.04 4.64   HGB 16.2 14.9   HCT 47.8 43.4   MCV 95 94   MCH 32.1* 32.1*   MCHC 33.9 34.3   RDW 12.1 12.1    167   MPV 9.2 9.0*   GRAN 64.2  3.1  --    LYMPH 20.9  1.0  --    MONO 13.3  0.7  --    BASO 0.02  --    NRBC 0  --        COAGULATION LAST 3 DAYS  Recent Labs   Lab 01/05/22  1527   LABPT 13.1   INR 1.1       CHEMISTRY LAST 3 DAYS  Recent Labs   Lab 01/05/22  1527 01/06/22  0454    141   K 3.9 3.8    105   CO2 25 24   ANIONGAP 13 12   BUN 16 15   CREATININE 1.0 0.8   * 88   CALCIUM 9.1 8.6*   MG 2.0 2.1   ALBUMIN 4.4 3.6   PROT 7.8 6.4   ALKPHOS 54* 43*   ALT 26 23   AST 31 25   BILITOT 0.8 1.1*       CARDIAC PROFILE LAST 3 DAYS  Recent Labs   Lab 01/05/22  1527   *   CPK 56   CPKMB 1.3   TROPONINI <0.030       ENDOCRINE LAST 3 DAYS  Recent Labs   Lab 01/05/22  1500   TSH 1.230       LAST ARTERIAL BLOOD GAS  ABG  No results for input(s): PH, PO2, PCO2, HCO3, BE in the last 168 hours.    LAST 7 DAYS MICROBIOLOGY   Microbiology Results (last 7 days)     ** No results found for the last 168 hours. **          MOST RECENT IMAGING  CTA Chest Non-Coronary (PE Study)  CMS MANDATED QUALITY DATA - CT RADIATION - 436    All CT scans at this facility utilize dose modulation, iterative reconstruction, and/or weight based dosing when appropriate to reduce radiation dose to as low as reasonably achievable.    Reason: Pulmonary embolism (PE) suspected, positive D-dimer Elevated D-Dimer, SOB, COVID+    TECHNIQUE: CT angiography of thorax with 70 mL Omnipaque 350.  Maximum intensity projection coronal reformations were created at a separate workstation and stored in the patient's permanent medical record.    COMPARISON: None    CTA THORAX:  Negative  for pulmonary embolus.    Heart is slightly enlarged with coronary artery calcifications. Thoracic aorta is of normal caliber and without dissection.    Minimal patchy centrilobular groundglass opacity occurs in right lower lobe (for example series 4 image 151). Lungs are otherwise clear. Trachea and main bronchi are patent. No pleural or pericardial effusion. No enlarged hilar, mediastinal, or axillary lymph nodes.    Visualized upper abdomen is unremarkable.    Degenerative changes affect the spine. No acute osseous abnormality.    IMPRESSION:    1. Negative for pulmonary embolus.  2. Coronary artery calcification and mild cardiomegaly.  3. Minimal patchy centrilobular groundglass opacity in right right lower lobe focally could represent minimal infectious or inflammatory bronchiolitis or alveolitis. Given history, this could reflect minimal parenchymal change related to reported Covid positivity.    Electronically signed by:  Umer Sam MD  1/5/2022 6:19 PM CST Workstation: [id number]  X-Ray Chest AP Portable  XR CHEST 1 VIEW    CLINICAL HISTORY:  66 years Male PALPITATIONS    COMPARISON: None    FINDINGS: Cardiomediastinal silhouette is within normal limits. Lungs are normally expanded with no airspace consolidation. No pleural effusion or pneumothorax. No acute osseous abnormality.    IMPRESSION: No acute pulmonary process.    Electronically signed by:  Eufemia Waters MD  1/5/2022 3:47 PM CST Workstation: [id number]      ECHOCARDIOGRAM RESULTS (last 5)  No results found for this or any previous visit.      CURRENT/PREVIOUS VISIT EKG  Results for orders placed or performed during the hospital encounter of 01/05/22   EKG 12-lead    Collection Time: 01/05/22  2:55 PM    Narrative    Test Reason : R07.9,    Vent. Rate : 167 BPM     Atrial Rate : 000 BPM     P-R Int : 000 ms          QRS Dur : 074 ms      QT Int : 246 ms       P-R-T Axes : 000 085 -16 degrees     QTc Int : 410 ms    Atrial fibrillation with  rapid ventricular response  Cannot rule out Anterior infarct ,age undetermined  Abnormal ECG  No previous ECGs available    Referred By: [email or web address]   SELF           Confirmed By:            ASSESSMENT/PLAN:     Active Hospital Problems    Diagnosis    *Atrial fibrillation with RVR    COVID-19    Primary hypertension       ASSESSMENT & PLAN:   ATRIAL FIBRILLATION RAPID RESPONSE CONTROLLED WITH CARDIZEM DRIP AND IS FULLY ANTICOAGULATED  Coronary artery calcification  Alveolitis on CT scan      RECOMMENDATIONS:  Agree with rate control full-dose anticoagulation and echocardiogram pending    Echo normal ejection fraction noted  Aries Franklin MD  [facility]  Department of Cardiology  Date of Service: 01/06/2022  10:24 AM

## 2022-01-06 NOTE — PLAN OF CARE
[facility]  Initial Discharge Assessment       Primary Care Provider: Primary Doctor No    Admission Diagnosis: Atrial fibrillation with RVR [I48.91]  Chest pain [R07.9]    Admission Date: 1/5/2022  Expected Discharge Date:     Discharge Barriers Identified: None     Assessment completed via phone with sister Rin Gaviria ([phone]) Sister unsure of advanced directives and POA.  Patient intends to discharge home where he lives alone with his sister across the street.  No needs identified at this time.    Payor: MEDICARE / Plan: MEDICARE PART A & B / Product Type: Government /     Extended Emergency Contact Information  Primary Emergency Contact: Rin Gaviria  Mobile Phone: [phone]  Relation: Sister  Preferred language: English   needed? No    Discharge Plan A: Home  Discharge Plan B: Home with family    No Pharmacies Listed    Initial Assessment (most recent)     Adult Discharge Assessment - 01/06/22 1153        Discharge Assessment    Assessment Type Discharge Planning Assessment     Confirmed/corrected address, phone number and insurance Yes     Confirmed Demographics Correct on Facesheet     Source of Information family     When was your last doctors appointment? --   unknown    Communicated SMITH with patient/caregiver Date not available/Unable to determine     Reason For Admission Covid     Lives With alone     Facility Arrived From: Urgent Care     Do you expect to return to your current living situation? Yes     Do you have help at home or someone to help you manage your care at home? Yes     Who are your caregiver(s) and their phone number(s)? Rin Gaviria [phone] (sister)     Prior to hospitilization cognitive status: Alert/Oriented     Current cognitive status: Alert/Oriented     Walking or Climbing Stairs Difficulty none     Dressing/Bathing Difficulty none     Home Accessibility not wheelchair accessible     Home Layout Able to live on 1st floor     Equipment Currently Used  at Home none     Readmission within 30 days? No     Patient currently being followed by outpatient case management? No     Do you currently have service(s) that help you manage your care at home? No     Do you take prescription medications? Yes     Do you have prescription coverage? Yes     Coverage [employer]     Do you have any problems affording any of your prescribed medications? No     Is the patient taking medications as prescribed? yes     Who is going to help you get home at discharge? self     How do you get to doctors appointments? car, drives self     Are you on dialysis? No     Do you take coumadin? No     Discharge Plan A Home     Discharge Plan B Home with family     DME Needed Upon Discharge  none     Discharge Plan discussed with: Sibling     Name(s) and Number(s) Rin Gaviria [phone]     Discharge Barriers Identified None

## 2022-01-06 NOTE — PLAN OF CARE
01/06/22 0749   Patient Assessment/Suction   Level of Consciousness (AVPU) alert   Respiratory Effort Unlabored   PRE-TX-O2   O2 Device (Oxygen Therapy) room air   SpO2 (!) 94 %   Pulse Oximetry Type Continuous   $ Pulse Oximetry - Multiple Charge Pulse Oximetry - Multiple   Pulse 85   Resp 16   Respiratory Evaluation   $ Care Plan Tech Time 15 min

## 2022-01-06 NOTE — SUBJECTIVE & OBJECTIVE
Past medical history:  Notable for essential hypertension  Past surgical history:  Reviewed.  Noncontributory  Family history:  Reviewed.  Positive for essential hypertension  Social history:  Former smoker.      Objective:     Vital Signs (Most Recent):  Temp: 98.4 °F (36.9 °C) (01/05/22 1458)  Pulse: 95 (01/05/22 1941)  Resp: 15 (01/05/22 1858)  BP: (!) 135/98 (01/05/22 1941)  SpO2: 97 % (01/05/22 1941) Vital Signs (24h Range):  Temp:  [98.4 °F (36.9 °C)] 98.4 °F (36.9 °C)  Pulse:  [] 95  Resp:  [13-19] 15  SpO2:  [97 %-99 %] 97 %  BP: (115-141)/() 135/98     Weight: 68 kg (150 lb)  Body mass index is 21.52 kg/m².    Physical Exam      General: Patient resting comfortably in no acute distress.  Eyes:  No conjunctival pallor. No scleral icterus.  Lungs:  No tachypnea or accessory muscle use  Cor:  Irregularly irregular.  Rate controlled.  No pedal edema.  Abd: Soft. Nontender. No HSM.  Musculoskeletal: No arthropathy, deformity.  Skin: No rashes, swelling, or erythema.  Neuro: A&O x3. Moving all extremities equally  Ext: No clubbing. No cyanosis. Peripheral pulses +      Significant Labs:   All pertinent labs within the past 24 hours have been reviewed.  CBC:   Recent Labs   Lab 01/05/22  1527   WBC 4.89   HGB 16.2   HCT 47.8        CMP:   Recent Labs   Lab 01/05/22  1527      K 3.9      CO2 25   *   BUN 16   CREATININE 1.0   CALCIUM 9.1   PROT 7.8   ALBUMIN 4.4   BILITOT 0.8   ALKPHOS 54*   AST 31   ALT 26   ANIONGAP 13   EGFRNONAA >60.0     Cardiac Markers:   Recent Labs   Lab 01/05/22  1527   *     Troponin:   Recent Labs   Lab 01/05/22  1527   TROPONINI <0.030       Significant Imaging: I have reviewed all pertinent imaging results/findings within the past 24 hours.     Imaging Results          CTA Chest Non-Coronary (PE Study) (Final result)  Result time 01/05/22 18:19:36    Final result by Umer Sam MD (01/05/22 18:19:36)                 Narrative:    CMS  MANDATED QUALITY DATA - CT RADIATION - 436    All CT scans at this facility utilize dose modulation, iterative reconstruction, and/or weight based dosing when appropriate to reduce radiation dose to as low as reasonably achievable.        Reason: Pulmonary embolism (PE) suspected, positive D-dimer Elevated D-Dimer, SOB, COVID+    TECHNIQUE: CT angiography of thorax with 70 mL Omnipaque 350.  Maximum intensity projection coronal reformations were created at a separate workstation and stored in the patient's permanent medical record.    COMPARISON: None    CTA THORAX:  Negative for pulmonary embolus.    Heart is slightly enlarged with coronary artery calcifications. Thoracic aorta is of normal caliber and without dissection.    Minimal patchy centrilobular groundglass opacity occurs in right lower lobe (for example series 4 image 151). Lungs are otherwise clear. Trachea and main bronchi are patent. No pleural or pericardial effusion. No enlarged hilar, mediastinal, or axillary lymph nodes.    Visualized upper abdomen is unremarkable.    Degenerative changes affect the spine. No acute osseous abnormality.    IMPRESSION:    1. Negative for pulmonary embolus.  2. Coronary artery calcification and mild cardiomegaly.  3. Minimal patchy centrilobular groundglass opacity in right right lower lobe focally could represent minimal infectious or inflammatory bronchiolitis or alveolitis. Given history, this could reflect minimal parenchymal change related to reported Covid positivity.    Electronically signed by:  Umer Sam MD  1/5/2022 6:19 PM CST Workstation: [id number]                             X-Ray Chest AP Portable (Final result)  Result time 01/05/22 15:47:10    Final result by Eufemia Waters MD (01/05/22 15:47:10)                 Narrative:    XR CHEST 1 VIEW    CLINICAL HISTORY:  66 years Male PALPITATIONS    COMPARISON: None    FINDINGS: Cardiomediastinal silhouette is within normal limits. Lungs are normally  expanded with no airspace consolidation. No pleural effusion or pneumothorax. No acute osseous abnormality.    IMPRESSION: No acute pulmonary process.    Electronically signed by:  Eufemia Waters MD  1/5/2022 3:47 PM [facility] Workstation: [id number]                            Results for orders placed or performed during the hospital encounter of 01/05/22   EKG 12-lead    Collection Time: 01/05/22  2:55 PM    Narrative    Test Reason : R07.9,    Vent. Rate : 167 BPM     Atrial Rate : 000 BPM     P-R Int : 000 ms          QRS Dur : 074 ms      QT Int : 246 ms       P-R-T Axes : 000 085 -16 degrees     QTc Int : 410 ms    Atrial fibrillation with rapid ventricular response  Cannot rule out Anterior infarct ,age undetermined  Abnormal ECG  No previous ECGs available    Referred By: [email or web address]   SELF           Confirmed By:

## 2022-01-07 LAB
ALBUMIN SERPL BCP-MCNC: 3.6 G/DL (ref 3.5–5.2)
ALP SERPL-CCNC: 43 U/L (ref 55–135)
ALT SERPL W/O P-5'-P-CCNC: 22 U/L (ref 10–44)
ANION GAP SERPL CALC-SCNC: 9 MMOL/L (ref 8–16)
AST SERPL-CCNC: 20 U/L (ref 10–40)
BILIRUB SERPL-MCNC: 1 MG/DL (ref 0.1–1)
BUN SERPL-MCNC: 14 MG/DL (ref 8–23)
CALCIUM SERPL-MCNC: 8.7 MG/DL (ref 8.7–10.5)
CHLORIDE SERPL-SCNC: 104 MMOL/L (ref 95–110)
CO2 SERPL-SCNC: 28 MMOL/L (ref 23–29)
CREAT SERPL-MCNC: 0.9 MG/DL (ref 0.5–1.4)
ERYTHROCYTE [DISTWIDTH] IN BLOOD BY AUTOMATED COUNT: 12 % (ref 11.5–14.5)
EST. GFR  (AFRICAN AMERICAN): >60 ML/MIN/1.73 M^2
EST. GFR  (NON AFRICAN AMERICAN): >60 ML/MIN/1.73 M^2
GLUCOSE SERPL-MCNC: 94 MG/DL (ref 70–110)
HCT VFR BLD AUTO: 43.4 % (ref 40–54)
HGB BLD-MCNC: 14.9 G/DL (ref 14–18)
MAGNESIUM SERPL-MCNC: 2 MG/DL (ref 1.6–2.6)
MCH RBC QN AUTO: 32 PG (ref 27–31)
MCHC RBC AUTO-ENTMCNC: 34.3 G/DL (ref 32–36)
MCV RBC AUTO: 93 FL (ref 82–98)
PLATELET # BLD AUTO: 172 K/UL (ref 150–450)
PMV BLD AUTO: 8.9 FL (ref 9.2–12.9)
POTASSIUM SERPL-SCNC: 3.8 MMOL/L (ref 3.5–5.1)
PROT SERPL-MCNC: 6.5 G/DL (ref 6–8.4)
RBC # BLD AUTO: 4.65 M/UL (ref 4.6–6.2)
SODIUM SERPL-SCNC: 141 MMOL/L (ref 136–145)
WBC # BLD AUTO: 4.31 K/UL (ref 3.9–12.7)

## 2022-01-07 PROCEDURE — 99900035 HC TECH TIME PER 15 MIN (STAT)

## 2022-01-07 PROCEDURE — 85027 COMPLETE CBC AUTOMATED: CPT | Performed by: INTERNAL MEDICINE

## 2022-01-07 PROCEDURE — 63600175 PHARM REV CODE 636 W HCPCS: Performed by: INTERNAL MEDICINE

## 2022-01-07 PROCEDURE — 80053 COMPREHEN METABOLIC PANEL: CPT | Performed by: INTERNAL MEDICINE

## 2022-01-07 PROCEDURE — 63600175 PHARM REV CODE 636 W HCPCS: Performed by: GENERAL PRACTICE

## 2022-01-07 PROCEDURE — 36415 COLL VENOUS BLD VENIPUNCTURE: CPT | Performed by: INTERNAL MEDICINE

## 2022-01-07 PROCEDURE — 94761 N-INVAS EAR/PLS OXIMETRY MLT: CPT

## 2022-01-07 PROCEDURE — 99232 SBSQ HOSP IP/OBS MODERATE 35: CPT | Mod: ,,, | Performed by: GENERAL PRACTICE

## 2022-01-07 PROCEDURE — 94799 UNLISTED PULMONARY SVC/PX: CPT

## 2022-01-07 PROCEDURE — 25000003 PHARM REV CODE 250

## 2022-01-07 PROCEDURE — 83735 ASSAY OF MAGNESIUM: CPT | Performed by: INTERNAL MEDICINE

## 2022-01-07 PROCEDURE — 99232 PR SUBSEQUENT HOSPITAL CARE,LEVL II: ICD-10-PCS | Mod: ,,, | Performed by: GENERAL PRACTICE

## 2022-01-07 PROCEDURE — 21000000 HC CCU ICU ROOM CHARGE

## 2022-01-07 PROCEDURE — 25000003 PHARM REV CODE 250: Performed by: INTERNAL MEDICINE

## 2022-01-07 RX ORDER — DIGOXIN 0.25 MG/ML
250 INJECTION INTRAMUSCULAR; INTRAVENOUS EVERY 6 HOURS
Status: COMPLETED | OUTPATIENT
Start: 2022-01-07 | End: 2022-01-08

## 2022-01-07 RX ADMIN — ENOXAPARIN SODIUM 70 MG: 80 INJECTION SUBCUTANEOUS at 10:01

## 2022-01-07 RX ADMIN — DIGOXIN 250 MCG: 250 INJECTION, SOLUTION INTRAMUSCULAR; INTRAVENOUS; PARENTERAL at 02:01

## 2022-01-07 RX ADMIN — ENOXAPARIN SODIUM 70 MG: 80 INJECTION SUBCUTANEOUS at 08:01

## 2022-01-07 RX ADMIN — DEXTROSE MONOHYDRATE 10 MG/HR: 50 INJECTION, SOLUTION INTRAVENOUS at 08:01

## 2022-01-07 RX ADMIN — CHLORHEXIDINE GLUCONATE 15 ML: 1.2 RINSE ORAL at 08:01

## 2022-01-07 RX ADMIN — DIGOXIN 250 MCG: 250 INJECTION, SOLUTION INTRAMUSCULAR; INTRAVENOUS; PARENTERAL at 05:01

## 2022-01-07 RX ADMIN — MUPIROCIN: 20 OINTMENT TOPICAL at 08:01

## 2022-01-07 NOTE — PROGRESS NOTES
[facility]  Progress Note    Patient Name: Les oGldman  MRN: [id number]  Patient Class: IP- Inpatient   Admission Date: 1/5/2022  Length of Stay: 1 days  Attending Physician: George Nunes MD  Primary Care Provider: Primary Doctor No        Subjective:     Principal Problem:Atrial fibrillation with RVR        HPI:  Patient is a 66-year-old  male with known history of essential hypertension who was sent to the ED from Urgent Care for new-onset atrial fibrillation with RVR.      Patient reports being in his usual state of health until New Year's Kelsie when he noticed symptoms suggestive of COVID-19 which include exertional shortness of breath, nonproductive cough, fatigue, abdominal pain and diarrhea.  These symptoms have subsided since.  He is currently asymptomatic except for mild fatigue.  He went to get tested at an urgent care to check if he was negative.  However he was noted to be in atrial fibrillation with RVR with heart rate in 170s and was sent here.  Patient denies chest pain, palpitations, lightheadedness/dizziness or shortness of breath.    Patient reports getting his booster COVID-19 Pfizer vaccine 2 days ago (01/03).  He already has been vaccinated with Moderna vaccine in February/March of 2021. Patient strongly believes that his atrial fibrillation was secondary to the vaccine.  No cardiac symptoms following Moderna vaccine.    Patient is on amlodipine 10 mg and olmesartan 40 mg daily for essential hypertension.  Reports compliance with medications.    Rest of the 10 point review of systems is negative except as mentioned above.        Overview/Hospital Course:  1/6/2022  Mr Goldman feels well at rest but becomes tachycardic and dyspneic with any exertion    1/7/2022  Mr Goldman is fine at rest but with any exertion or stress his HR will elevated-A fib with RVR      Interval History: Mr Goldman continues to have afib with RVR during exertion or when  stressed.    Review of Systems   Constitutional: Positive for activity change, diaphoresis and fatigue.   HENT: Negative.    Eyes: Negative.    Respiratory: Positive for chest tightness and shortness of breath.         JOHNSON   Cardiovascular: Positive for palpitations. Negative for chest pain.   Gastrointestinal: Negative.    Endocrine: Positive for heat intolerance.   Genitourinary: Negative.    Musculoskeletal: Positive for arthralgias and myalgias.   Skin: Negative.    Allergic/Immunologic: Negative.    Neurological: Positive for weakness.   Hematological: Negative.    Psychiatric/Behavioral: The patient is nervous/anxious.      Objective:     Vital Signs (Most Recent):  Temp: 97.8 °F (36.6 °C) (01/07/22 1101)  Pulse: 90 (01/07/22 1200)  Resp: (!) 23 (01/07/22 1200)  BP: 111/83 (01/07/22 1200)  SpO2: 97 % (01/07/22 1200) Vital Signs (24h Range):  Temp:  [97.5 °F (36.4 °C)-98.1 °F (36.7 °C)] 97.8 °F (36.6 °C)  Pulse:  [] 90  Resp:  [15-41] 23  SpO2:  [93 %-99 %] 97 %  BP: (102-130)/(56-85) 111/83     Weight: 66.4 kg (146 lb 6.2 oz)  Body mass index is 21 kg/m².    Intake/Output Summary (Last 24 hours) at 1/7/2022 1503  Last data filed at 1/7/2022 1100  Gross per 24 hour   Intake 894.92 ml   Output 2725 ml   Net -1830.08 ml      Physical Exam  Vitals and nursing note reviewed.   Constitutional:       General: He is not in acute distress.  HENT:      Head: Normocephalic and atraumatic.      Nose: Nose normal.      Mouth/Throat:      Mouth: Mucous membranes are moist.   Eyes:      Extraocular Movements: Extraocular movements intact.      Pupils: Pupils are equal, round, and reactive to light.   Cardiovascular:      Rate and Rhythm: Tachycardia present. Rhythm irregular.   Pulmonary:      Effort: Pulmonary effort is normal.      Breath sounds: Normal breath sounds.   Abdominal:      General: Bowel sounds are normal. There is no distension.      Tenderness: There is no abdominal tenderness. There is no guarding.    Musculoskeletal:         General: Normal range of motion.      Cervical back: Normal range of motion and neck supple.   Skin:     General: Skin is warm.   Neurological:      General: No focal deficit present.      Mental Status: He is alert and oriented to person, place, and time.   Psychiatric:      Comments: anxious         Significant Labs:   All pertinent labs within the past 24 hours have been reviewed.  Recent Lab Results       01/07/22  0353        Albumin 3.6       Alkaline Phosphatase 43       ALT 22       Anion Gap 9       AST 20       BILIRUBIN TOTAL 1.0  Comment: For infants and newborns, interpretation of results should be based  on gestational age, weight and in agreement with clinical  observations.    Premature Infant recommended reference ranges:  Up to 24 hours.............<8.0 mg/dL  Up to 48 hours............<12.0 mg/dL  3-5 days..................<15.0 mg/dL  6-29 days.................<15.0 mg/dL         BUN 14       Calcium 8.7       Chloride 104       CO2 28       Creatinine 0.9       eGFR if  >60.0       eGFR if non  >60.0  Comment: Calculation used to obtain the estimated glomerular filtration  rate (eGFR) is the CKD-EPI equation.          Glucose 94       Hematocrit 43.4       Hemoglobin 14.9       Magnesium 2.0       MCH 32.0       MCHC 34.3       MCV 93       MPV 8.9       Platelets 172       Potassium 3.8       PROTEIN TOTAL 6.5       RBC 4.65       RDW 12.0       Sodium 141       WBC 4.31             Significant Imaging: I have reviewed all pertinent imaging results/findings within the past 24 hours.      Assessment/Plan:    1/7/2022  A)  Afib with RVR with exertion or anxiety  Covid 19 infection  HTN  P)  Digoxin 0.25 mg IVP Q 6 X 4  Cardiology input appreciated  Otherwise continue current therapy  No notes have been filed under this hospital service.  Service: Hospital Medicine    VTE Risk Mitigation (From admission, onward)         Ordered      enoxaparin injection 70 mg  2 times daily         01/06/22 0119                Discharge Planning   SMITH:      Code Status: Full Code   Is the patient medically ready for discharge?:     Reason for patient still in hospital (select all that apply): Patient unstable, Treatment and Consult recommendations  Discharge Plan A: Home                  George Nunes MD  Department of Hospital Medicine   Pending sale to [facility]

## 2022-01-07 NOTE — SUBJECTIVE & OBJECTIVE
Interval History: Mr Funk heart rate remains normal at rest but has tachycardia with any activity    Review of Systems   Constitutional: Positive for activity change, diaphoresis and fatigue.   HENT: Negative.    Eyes: Negative.    Respiratory: Positive for chest tightness.    Cardiovascular: Positive for palpitations.   Gastrointestinal: Negative.    Endocrine: Negative.    Genitourinary: Negative.    Musculoskeletal: Negative.    Skin: Negative.    Allergic/Immunologic: Negative.    Neurological: Positive for weakness.   Hematological: Negative.    Psychiatric/Behavioral: Positive for dysphoric mood. The patient is nervous/anxious.      Objective:     Vital Signs (Most Recent):  Temp: 97.5 °F (36.4 °C) (01/06/22 1640)  Pulse: 65 (01/06/22 1738)  Resp: 18 (01/06/22 1738)  BP: 120/80 (01/06/22 1705)  SpO2: 96 % (01/06/22 1738) Vital Signs (24h Range):  Temp:  [97.3 °F (36.3 °C)-98.4 °F (36.9 °C)] 97.5 °F (36.4 °C)  Pulse:  [] 65  Resp:  [14-39] 18  SpO2:  [83 %-99 %] 96 %  BP: ()/(57-98) 120/80     Weight: 66.4 kg (146 lb 6.2 oz)  Body mass index is 21 kg/m².    Intake/Output Summary (Last 24 hours) at 1/6/2022 1821  Last data filed at 1/6/2022 1735  Gross per 24 hour   Intake 1134.92 ml   Output 1225 ml   Net -90.08 ml      Physical Exam  Vitals reviewed.   Constitutional:       General: He is not in acute distress.     Appearance: Normal appearance.   HENT:      Head: Normocephalic and atraumatic.      Nose: Nose normal.      Mouth/Throat:      Mouth: Mucous membranes are moist.   Eyes:      Extraocular Movements: Extraocular movements intact.      Pupils: Pupils are equal, round, and reactive to light.   Cardiovascular:      Rate and Rhythm: Normal rate and regular rhythm.   Pulmonary:      Effort: Pulmonary effort is normal.      Breath sounds: Normal breath sounds.   Abdominal:      General: Bowel sounds are normal. There is no distension.      Tenderness: There is no abdominal tenderness.  There is no guarding.   Musculoskeletal:         General: Normal range of motion.      Cervical back: Normal range of motion and neck supple.   Skin:     General: Skin is warm.   Neurological:      General: No focal deficit present.      Mental Status: He is alert and oriented to person, place, and time.   Psychiatric:      Comments: Anxious and mildly dysphoric         Significant Labs:   All pertinent labs within the past 24 hours have been reviewed.  Recent Lab Results       01/06/22  0817   01/06/22  0454        Albumin   3.6       Alkaline Phosphatase   43       ALT   23       Anion Gap   12       Ao root annulus 2.49         Ao peak capri 0.99         Ao VTI 16.71         AST   25       AV valve area 2.57         AORTIC VALVE CUSP SEPERATION 1.89         AV mean gradient 2         AV index (prosthetic) 0.79         AV peak gradient 4         AV Velocity Ratio 86.42         BILIRUBIN TOTAL   1.1  Comment: For infants and newborns, interpretation of results should be based  on gestational age, weight and in agreement with clinical  observations.    Premature Infant recommended reference ranges:  Up to 24 hours.............<8.0 mg/dL  Up to 48 hours............<12.0 mg/dL  3-5 days..................<15.0 mg/dL  6-29 days.................<15.0 mg/dL         BSA 1.81         BUN   15       Calcium   8.6       Chloride   105       CO2   24       Creatinine   0.8       Left Ventricle Relative Wall Thickness 0.40         E/E' ratio 9.83         eGFR if    >60.0       eGFR if non    >60.0  Comment: Calculation used to obtain the estimated glomerular filtration  rate (eGFR) is the CKD-EPI equation.          EF 60         E wave deceleration time 179.84         FS 28         Glucose   88       Hematocrit   43.4       Hemoglobin   14.9       IVRT 91.17         IVS 0.90         LVOT area 3.2         LV LATERAL E/E' RATIO 10.27         LV SEPTAL E/E' RATIO 9.42         LVIDd 4.58         LVIDs  3.31         LV mass 137.75         LV Mass Index 75         LVOT diameter 2.03         LVOT peak jaylon 85.56         LVOT stroke volume 42.93         LVOT peak VTI 13.27         Magnesium   2.1       MCH   32.1       MCHC   34.3       MCV   94       Mean e' 0.12         MPV   9.0       MV Peak E Jaylon 1.13         Platelets   167       Potassium   3.8       PROTEIN TOTAL   6.4       PV PEAK VELOCITY 70.89         Posterior Wall 0.91         Right Atrial Pressure (from IVC) 15         RBC   4.64       RDW   12.1       RVDD 452.00         Sodium   141       TDI SEPTAL 0.12         TDI LATERAL 0.11         Triscuspid Valve Regurgitation Peak Gradient 25         TR Max Jaylon 2.52         TV rest pulmonary artery pressure 40         WBC   4.10             Significant Imaging: I have reviewed all pertinent imaging results/findings within the past 24 hours.

## 2022-01-07 NOTE — PROGRESS NOTES
[facility]  Department of Cardiology  Progress Note    PATIENT NAME: Les Goldman  MRN: [id number]  TODAY'S DATE: 01/07/2022  ADMIT DATE: 1/5/2022    SUBJECTIVE     PRINCIPLE PROBLEM: Atrial fibrillation with RVR    INTERVAL HISTORY:    1/7/2022  Cardizem drip was stopped and his the rate is controlled 80s to 90s.  He again does get tachycardic 120-150 briefly when he goes to the bathroom and comes back down quickly.  He remains NPO    Review of patient's allergies indicates:  No Known Allergies    REVIEW OF SYSTEMS  Negative review of systems per the nurse  OBJECTIVE     VITAL SIGNS (Most Recent)  Temp: 98.1 °F (36.7 °C) (01/06/22 2001)  Pulse: 86 (01/07/22 1020)  Resp: 20 (01/07/22 1020)  BP: 118/82 (01/07/22 1000)  SpO2: 98 % (01/07/22 1020)    VENTILATION STATUS  Resp: 20 (01/07/22 1020)  SpO2: 98 % (01/07/22 1020)       I & O (Last 24H):    Intake/Output Summary (Last 24 hours) at 1/7/2022 1108  Last data filed at 1/7/2022 1100  Gross per 24 hour   Intake 1254.92 ml   Output 2725 ml   Net -1470.08 ml       WEIGHTS  Wt Readings from Last 3 Encounters:   01/06/22 0015 66.4 kg (146 lb 6.2 oz)   01/05/22 1458 68 kg (150 lb)   01/06/22 0830 66.2 kg (146 lb)       PHYSICAL EXAM  Patient was not examined secondary to COVID  SCHEDULED MEDS:   chlorhexidine  15 mL Mouth/Throat BID    enoxaparin  1 mg/kg Subcutaneous BID    mupirocin   Nasal BID       CONTINUOUS INFUSIONS:   dilTIAZem Stopped (01/07/22 0000)       PRN MEDS:acetaminophen, calcium chloride IVPB, calcium chloride IVPB, calcium chloride IVPB, dextromethorphan-guaiFENesin  mg/5 ml, dextrose 50%, dextrose 50%, glucagon (human recombinant), glucose, glucose, magnesium oxide, magnesium sulfate IVPB, magnesium sulfate IVPB, magnesium sulfate IVPB, magnesium sulfate IVPB, potassium chloride in water, potassium chloride in water, potassium chloride in water, potassium chloride in water, potassium chloride, potassium chloride, potassium  chloride, potassium chloride, sodium chloride 0.9%    LABS AND DIAGNOSTICS     CBC LAST 3 DAYS  Recent Labs   Lab 01/05/22  1527 01/06/22  0454 01/07/22  0353   WBC 4.89 4.10 4.31   RBC 5.04 4.64 4.65   HGB 16.2 14.9 14.9   HCT 47.8 43.4 43.4   MCV 95 94 93   MCH 32.1* 32.1* 32.0*   MCHC 33.9 34.3 34.3   RDW 12.1 12.1 12.0    167 172   MPV 9.2 9.0* 8.9*   GRAN 64.2  3.1  --   --    LYMPH 20.9  1.0  --   --    MONO 13.3  0.7  --   --    BASO 0.02  --   --    NRBC 0  --   --        COAGULATION LAST 3 DAYS  Recent Labs   Lab 01/05/22  1527   LABPT 13.1   INR 1.1       CHEMISTRY LAST 3 DAYS  Recent Labs   Lab 01/05/22  1527 01/06/22  0454 01/07/22  0353    141 141   K 3.9 3.8 3.8    105 104   CO2 25 24 28   ANIONGAP 13 12 9   BUN 16 15 14   CREATININE 1.0 0.8 0.9   * 88 94   CALCIUM 9.1 8.6* 8.7   MG 2.0 2.1 2.0   ALBUMIN 4.4 3.6 3.6   PROT 7.8 6.4 6.5   ALKPHOS 54* 43* 43*   ALT 26 23 22   AST 31 25 20   BILITOT 0.8 1.1* 1.0       CARDIAC PROFILE LAST 3 DAYS  Recent Labs   Lab 01/05/22  1527   *   CPK 56   CPKMB 1.3   TROPONINI <0.030       ENDOCRINE LAST 3 DAYS  Recent Labs   Lab 01/05/22  1500   TSH 1.230       LAST ARTERIAL BLOOD GAS  ABG  No results for input(s): PH, PO2, PCO2, HCO3, BE in the last 168 hours.    LAST 7 DAYS MICROBIOLOGY   Microbiology Results (last 7 days)       ** No results found for the last 168 hours. **            MOST RECENT IMAGING  Echo  · The left ventricle is normal in size with normal systolic function.  · The estimated ejection fraction is 60%.  · Normal left ventricular diastolic function.  · There is abnormal septal wall motion. There is systolic and diastolic   flattening of the interventricular septum consistent with right ventricle   pressure and volume overload.  · Normal right ventricular size with mildly reduced right ventricular   systolic function.  · Moderate left atrial enlargement.  · Mild right atrial enlargement.  · Mild tricuspid  regurgitation.  · Elevated central venous pressure (15 mmHg).  · The estimated PA systolic pressure is 40 mmHg.         LASTECHO  Results for orders placed during the hospital encounter of 01/05/22    Echo    Interpretation Summary  · The left ventricle is normal in size with normal systolic function.  · The estimated ejection fraction is 60%.  · Normal left ventricular diastolic function.  · There is abnormal septal wall motion. There is systolic and diastolic flattening of the interventricular septum consistent with right ventricle pressure and volume overload.  · Normal right ventricular size with mildly reduced right ventricular systolic function.  · Moderate left atrial enlargement.  · Mild right atrial enlargement.  · Mild tricuspid regurgitation.  · Elevated central venous pressure (15 mmHg).  · The estimated PA systolic pressure is 40 mmHg.      CURRENT/PREVIOUS VISIT EKG  Results for orders placed or performed during the hospital encounter of 01/05/22   EKG 12-lead    Collection Time: 01/05/22  2:55 PM    Narrative    Test Reason : R07.9,    Vent. Rate : 167 BPM     Atrial Rate : 000 BPM     P-R Int : 000 ms          QRS Dur : 074 ms      QT Int : 246 ms       P-R-T Axes : 000 085 -16 degrees     QTc Int : 410 ms    Atrial fibrillation with rapid ventricular response  Cannot rule out Anterior infarct ,age undetermined  Abnormal ECG  No previous ECGs available    Referred By: [email or web address]   SELF           Confirmed By:        ASSESSMENT/PLAN:     Active Hospital Problems    Diagnosis    *Atrial fibrillation with RVR    COVID-19    Primary hypertension       ASSESSMENT & PLAN:   Atrial fibrillation rapid response      RECOMMENDATIONS:  Will load the patient with digoxin hopefully his heart rate will be controlled adequately when he gets up.      Aries Franklin MD  [facility]  Department of Cardiology  Date of Service: 01/07/2022  11:08 AM

## 2022-01-07 NOTE — NURSING
patient's heart rate increases to 140s-150s with exertion. afib rvr.   It quickly returns to low 100s-90s at rest. Pt doesn't feel symptomatic when this occurs.   Dr. Franklin made aware. See new orders.

## 2022-01-07 NOTE — PROGRESS NOTES
"[facility]  Adult Nutrition   Progress Note (Initial Assessment)     SUMMARY     Recommendations/Interventions:    Recommendation/Intervention: 1. Continue current diet as tolerated, encourage intake. 2.  to assist in meal choices daily.  Goals: 1. Patient to continue to meet estimated needs through meal intake.  Nutrition Goal Status: new    Dietitian Rounds Brief:  · ICU Status. Patient admitted 2' A-Fib w/ RVR + COVID-19. A-fib control at rest but re-occurent on ambulation per progress notes. Appetite and intake are good- consuming % of meals flowsheets. Will continue to monitor intake, labs, and plan of care.  Reason for Assessment  Reason For Assessment: identified at risk by screening criteria (ICU Status)  Diagnosis:  (A-Fib with RVR, COVID-19)  Relevant Medical History: A-Fib w/ RVR, COVID-19, HTN  Interdisciplinary Rounds: attended    Nutrition Risk Screen  Nutrition Risk Screen: no indicators present     MST Score: 0  Have you recently lost weight without trying?: No  Weight loss score: 0  Have you been eating poorly because of a decreased appetite?: No  Appetite score: 0     Nutrition/Diet History  Food Allergies: NKFA  Factors Affecting Nutritional Intake: None identified at this time    Anthropometrics  Temp: 98.1 °F (36.7 °C)  Height Method: Stated  Height: 5' 10" (177.8 cm)  Height (inches): 70 in  Weight Method: Bed Scale  Weight: 66.4 kg (146 lb 6.2 oz)  Weight (lb): 146.39 lb  Ideal Body Weight (IBW), Male: 166 lb  % Ideal Body Weight, Male (lb): 88.19 %  BMI (Calculated): 21  BMI Grade: 18.5-24.9 - normal     Weight History:  Wt Readings from Last 10 Encounters:   01/06/22 66.4 kg (146 lb 6.2 oz)   01/06/22 66.2 kg (146 lb)     Lab/Procedures/Meds: Pertinent Labs Reviewed  Clinical Chemistry:  Recent Labs   Lab 01/07/22  0353      K 3.8      CO2 28   GLU 94   BUN 14   CREATININE 0.9   CALCIUM 8.7   PROT 6.5   ALBUMIN 3.6   BILITOT 1.0   ALKPHOS 43*   AST 20 "   ALT 22   ANIONGAP 9   ESTGFRAFRICA >60.0   EGFRNONAA >60.0   MG 2.0     CBC:   Recent Labs   Lab 01/07/22  0353   WBC 4.31   RBC 4.65   HGB 14.9   HCT 43.4      MCV 93   MCH 32.0*   MCHC 34.3     Cardiac Profile:  Recent Labs   Lab 01/05/22  1527   *   CPK 56   CPKMB 1.3   TROPONINI <0.030     Thyroid & Parathyroid:  Recent Labs   Lab 01/05/22  1500   TSH 1.230     Medications: Pertinent Medications reviewed  Scheduled Meds:   chlorhexidine  15 mL Mouth/Throat BID    enoxaparin  1 mg/kg Subcutaneous BID    mupirocin   Nasal BID     Continuous Infusions:   dilTIAZem Stopped (01/07/22 0000)     PRN Meds:.acetaminophen, calcium chloride IVPB, calcium chloride IVPB, calcium chloride IVPB, dextromethorphan-guaiFENesin  mg/5 ml, dextrose 50%, dextrose 50%, glucagon (human recombinant), glucose, glucose, magnesium oxide, magnesium sulfate IVPB, magnesium sulfate IVPB, magnesium sulfate IVPB, magnesium sulfate IVPB, potassium chloride in water, potassium chloride in water, potassium chloride in water, potassium chloride in water, potassium chloride, potassium chloride, potassium chloride, potassium chloride, sodium chloride 0.9%    Antibiotics (From admission, onward)            Start     Stop Route Frequency Ordered    01/06/22 0945  mupirocin 2 % ointment  (MRSA Decolonization Orders [facility])         01/11 0859 Nasl 2 times daily 01/06/22 0834        Estimated/Assessed Needs  Weight Used For Calorie Calculations: 66.2 kg (145 lb 15.1 oz)  Energy Calorie Requirements (kcal): 1164-4227 kcals/day (25-30 kcals/kg)  Energy Need Method: Kcal/kg  Protein Requirements:  g/day (1.2-1.5 g/kg)  Weight Used For Protein Calculations: 66.2 kg (145 lb 15.1 oz)  Fluid Requirements (mL): 1 mL/kcal or per MD  RDA Method (mL): 1655    Nutrition Prescription Ordered    Current Diet Order: Cardiac Diet    Evaluation of Received Nutrient/Fluid Intake    Energy Calories Required: meeting needs  Protein Required:  meeting needs  Fluid Required: meeting needs  Tolerance: tolerating  % Intake of Estimated Energy Needs: 75 - 100 %  % Meal Intake: 75 - 100 %    Intake/Output Summary (Last 24 hours) at 1/7/2022 0923  Last data filed at 1/7/2022 0600  Gross per 24 hour   Intake 894.92 ml   Output 2325 ml   Net -1430.08 ml      Nutrition Risk    Level of Risk/Frequency of Follow-up: moderate - high   Monitor and Evaluation    Food and Nutrient Intake: energy intake,food and beverage intake  Food and Nutrient Adminstration: diet order  Physical Activity and Function: nutrition-related ADLs and IADLs,factors affecting access to physical activity  Anthropometric Measurements: weight,weight change,body mass index  Biochemical Data, Medical Tests and Procedures: electrolyte and renal panel,gastrointestinal profile,glucose/endocrine profile,inflammatory profile,lipid profile  Nutrition-Focused Physical Findings: overall appearance     Nutrition Follow-Up    RD Follow-up?: Yes  Lien Ellis RD 01/07/2022 9:33 AM

## 2022-01-07 NOTE — SUBJECTIVE & OBJECTIVE
Interval History: Mr Goldman continues to have afib with RVR during exertion or when stressed.    Review of Systems   Constitutional: Positive for activity change, diaphoresis and fatigue.   HENT: Negative.    Eyes: Negative.    Respiratory: Positive for chest tightness and shortness of breath.         JOHNSON   Cardiovascular: Positive for palpitations. Negative for chest pain.   Gastrointestinal: Negative.    Endocrine: Positive for heat intolerance.   Genitourinary: Negative.    Musculoskeletal: Positive for arthralgias and myalgias.   Skin: Negative.    Allergic/Immunologic: Negative.    Neurological: Positive for weakness.   Hematological: Negative.    Psychiatric/Behavioral: The patient is nervous/anxious.      Objective:     Vital Signs (Most Recent):  Temp: 97.8 °F (36.6 °C) (01/07/22 1101)  Pulse: 90 (01/07/22 1200)  Resp: (!) 23 (01/07/22 1200)  BP: 111/83 (01/07/22 1200)  SpO2: 97 % (01/07/22 1200) Vital Signs (24h Range):  Temp:  [97.5 °F (36.4 °C)-98.1 °F (36.7 °C)] 97.8 °F (36.6 °C)  Pulse:  [] 90  Resp:  [15-41] 23  SpO2:  [93 %-99 %] 97 %  BP: (102-130)/(56-85) 111/83     Weight: 66.4 kg (146 lb 6.2 oz)  Body mass index is 21 kg/m².    Intake/Output Summary (Last 24 hours) at 1/7/2022 1503  Last data filed at 1/7/2022 1100  Gross per 24 hour   Intake 894.92 ml   Output 2725 ml   Net -1830.08 ml      Physical Exam  Vitals and nursing note reviewed.   Constitutional:       General: He is not in acute distress.  HENT:      Head: Normocephalic and atraumatic.      Nose: Nose normal.      Mouth/Throat:      Mouth: Mucous membranes are moist.   Eyes:      Extraocular Movements: Extraocular movements intact.      Pupils: Pupils are equal, round, and reactive to light.   Cardiovascular:      Rate and Rhythm: Tachycardia present. Rhythm irregular.   Pulmonary:      Effort: Pulmonary effort is normal.      Breath sounds: Normal breath sounds.   Abdominal:      General: Bowel sounds are normal. There is no  distension.      Tenderness: There is no abdominal tenderness. There is no guarding.   Musculoskeletal:         General: Normal range of motion.      Cervical back: Normal range of motion and neck supple.   Skin:     General: Skin is warm.   Neurological:      General: No focal deficit present.      Mental Status: He is alert and oriented to person, place, and time.   Psychiatric:      Comments: anxious         Significant Labs:   All pertinent labs within the past 24 hours have been reviewed.  Recent Lab Results       01/07/22  0353        Albumin 3.6       Alkaline Phosphatase 43       ALT 22       Anion Gap 9       AST 20       BILIRUBIN TOTAL 1.0  Comment: For infants and newborns, interpretation of results should be based  on gestational age, weight and in agreement with clinical  observations.    Premature Infant recommended reference ranges:  Up to 24 hours.............<8.0 mg/dL  Up to 48 hours............<12.0 mg/dL  3-5 days..................<15.0 mg/dL  6-29 days.................<15.0 mg/dL         BUN 14       Calcium 8.7       Chloride 104       CO2 28       Creatinine 0.9       eGFR if  >60.0       eGFR if non  >60.0  Comment: Calculation used to obtain the estimated glomerular filtration  rate (eGFR) is the CKD-EPI equation.          Glucose 94       Hematocrit 43.4       Hemoglobin 14.9       Magnesium 2.0       MCH 32.0       MCHC 34.3       MCV 93       MPV 8.9       Platelets 172       Potassium 3.8       PROTEIN TOTAL 6.5       RBC 4.65       RDW 12.0       Sodium 141       WBC 4.31             Significant Imaging: I have reviewed all pertinent imaging results/findings within the past 24 hours.

## 2022-01-07 NOTE — PROGRESS NOTES
[facility] Medicine  Progress Note    Patient Name: Les Golmdan  MRN: [id number]  Patient Class: IP- Inpatient   Admission Date: 1/5/2022  Length of Stay: 0 days  Attending Physician: George Nunes MD  Primary Care Provider: Primary Doctor No        Subjective:     Principal Problem:Atrial fibrillation with RVR        HPI:  Patient is a 66-year-old  male with known history of essential hypertension who was sent to the ED from Urgent Care for new-onset atrial fibrillation with RVR.      Patient reports being in his usual state of health until New Year's Kelsie when he noticed symptoms suggestive of COVID-19 which include exertional shortness of breath, nonproductive cough, fatigue, abdominal pain and diarrhea.  These symptoms have subsided since.  He is currently asymptomatic except for mild fatigue.  He went to get tested at an urgent care to check if he was negative.  However he was noted to be in atrial fibrillation with RVR with heart rate in 170s and was sent here.  Patient denies chest pain, palpitations, lightheadedness/dizziness or shortness of breath.    Patient reports getting his booster COVID-19 Pfizer vaccine 2 days ago (01/03).  He already has been vaccinated with Moderna vaccine in February/March of 2021. Patient strongly believes that his atrial fibrillation was secondary to the vaccine.  No cardiac symptoms following Moderna vaccine.    Patient is on amlodipine 10 mg and olmesartan 40 mg daily for essential hypertension.  Reports compliance with medications.    Rest of the 10 point review of systems is negative except as mentioned above.        Overview/Hospital Course:  1/6/2022  Mr Goldman feels well at rest but becomes tachycardic and dyspneic with any exertion      Interval History: Mr Goldman's heart rate remains normal at rest but has tachycardia with any activity    Review of Systems   Constitutional: Positive for activity change, diaphoresis and  fatigue.   HENT: Negative.    Eyes: Negative.    Respiratory: Positive for chest tightness.    Cardiovascular: Positive for palpitations.   Gastrointestinal: Negative.    Endocrine: Negative.    Genitourinary: Negative.    Musculoskeletal: Negative.    Skin: Negative.    Allergic/Immunologic: Negative.    Neurological: Positive for weakness.   Hematological: Negative.    Psychiatric/Behavioral: Positive for dysphoric mood. The patient is nervous/anxious.      Objective:     Vital Signs (Most Recent):  Temp: 97.5 °F (36.4 °C) (01/06/22 1640)  Pulse: 65 (01/06/22 1738)  Resp: 18 (01/06/22 1738)  BP: 120/80 (01/06/22 1705)  SpO2: 96 % (01/06/22 1738) Vital Signs (24h Range):  Temp:  [97.3 °F (36.3 °C)-98.4 °F (36.9 °C)] 97.5 °F (36.4 °C)  Pulse:  [] 65  Resp:  [14-39] 18  SpO2:  [83 %-99 %] 96 %  BP: ()/(57-98) 120/80     Weight: 66.4 kg (146 lb 6.2 oz)  Body mass index is 21 kg/m².    Intake/Output Summary (Last 24 hours) at 1/6/2022 1821  Last data filed at 1/6/2022 1735  Gross per 24 hour   Intake 1134.92 ml   Output 1225 ml   Net -90.08 ml      Physical Exam  Vitals reviewed.   Constitutional:       General: He is not in acute distress.     Appearance: Normal appearance.   HENT:      Head: Normocephalic and atraumatic.      Nose: Nose normal.      Mouth/Throat:      Mouth: Mucous membranes are moist.   Eyes:      Extraocular Movements: Extraocular movements intact.      Pupils: Pupils are equal, round, and reactive to light.   Cardiovascular:      Rate and Rhythm: Normal rate and regular rhythm.   Pulmonary:      Effort: Pulmonary effort is normal.      Breath sounds: Normal breath sounds.   Abdominal:      General: Bowel sounds are normal. There is no distension.      Tenderness: There is no abdominal tenderness. There is no guarding.   Musculoskeletal:         General: Normal range of motion.      Cervical back: Normal range of motion and neck supple.   Skin:     General: Skin is warm.   Neurological:       General: No focal deficit present.      Mental Status: He is alert and oriented to person, place, and time.   Psychiatric:      Comments: Anxious and mildly dysphoric         Significant Labs:   All pertinent labs within the past 24 hours have been reviewed.  Recent Lab Results       01/06/22  0817   01/06/22  0454        Albumin   3.6       Alkaline Phosphatase   43       ALT   23       Anion Gap   12       Ao root annulus 2.49         Ao peak capri 0.99         Ao VTI 16.71         AST   25       AV valve area 2.57         AORTIC VALVE CUSP SEPERATION 1.89         AV mean gradient 2         AV index (prosthetic) 0.79         AV peak gradient 4         AV Velocity Ratio 86.42         BILIRUBIN TOTAL   1.1  Comment: For infants and newborns, interpretation of results should be based  on gestational age, weight and in agreement with clinical  observations.    Premature Infant recommended reference ranges:  Up to 24 hours.............<8.0 mg/dL  Up to 48 hours............<12.0 mg/dL  3-5 days..................<15.0 mg/dL  6-29 days.................<15.0 mg/dL         BSA 1.81         BUN   15       Calcium   8.6       Chloride   105       CO2   24       Creatinine   0.8       Left Ventricle Relative Wall Thickness 0.40         E/E' ratio 9.83         eGFR if    >60.0       eGFR if non    >60.0  Comment: Calculation used to obtain the estimated glomerular filtration  rate (eGFR) is the CKD-EPI equation.          EF 60         E wave deceleration time 179.84         FS 28         Glucose   88       Hematocrit   43.4       Hemoglobin   14.9       IVRT 91.17         IVS 0.90         LVOT area 3.2         LV LATERAL E/E' RATIO 10.27         LV SEPTAL E/E' RATIO 9.42         LVIDd 4.58         LVIDs 3.31         LV mass 137.75         LV Mass Index 75         LVOT diameter 2.03         LVOT peak capri 85.56         LVOT stroke volume 42.93         LVOT peak VTI 13.27         Magnesium    2.1       MCH   32.1       MCHC   34.3       MCV   94       Mean e' 0.12         MPV   9.0       MV Peak E Jaylon 1.13         Platelets   167       Potassium   3.8       PROTEIN TOTAL   6.4       PV PEAK VELOCITY 70.89         Posterior Wall 0.91         Right Atrial Pressure (from IVC) 15         RBC   4.64       RDW   12.1       RVDD 452.00         Sodium   141       TDI SEPTAL 0.12         TDI LATERAL 0.11         Triscuspid Valve Regurgitation Peak Gradient 25         TR Max Jaylon 2.52         TV rest pulmonary artery pressure 40         WBC   4.10             Significant Imaging: I have reviewed all pertinent imaging results/findings within the past 24 hours.      Assessment/Plan:    1/6/2022  A)  Atrial fibrillation with RVR with control at rest but recurrence with any activity  Covid 19 disease  HTN  P)  Continue present therapy  Cardiology input appreciated    No notes have been filed under this hospital service.  Service: Hospital Medicine    VTE Risk Mitigation (From admission, onward)         Ordered     enoxaparin injection 70 mg  2 times daily         01/06/22 0119                Discharge Planning   SMITH: 1/8/2022     Code Status: Full Code   Is the patient medically ready for discharge?:     Reason for patient still in hospital (select all that apply): Patient unstable, Patient new problem, Treatment and Consult recommendations  Discharge Plan A: Home                  George Nunes MD  Department of Hospital Medicine   Formerly [facility], 1928–1983

## 2022-01-07 NOTE — NURSING
Pt sat up to eat dinner on the side of the bed. Heart rate 130s-160s. Restarted cardizem, gave ivp dig. See mar.  Patient instructed to lay back. Heart rate decreased to 80s-90s. Pt is asymptomatic with no complaints.

## 2022-01-07 NOTE — PLAN OF CARE
01/06/22 2118   PRE-TX-O2   O2 Device (Oxygen Therapy) room air   SpO2 98 %   Pulse Oximetry Type Continuous   $ Pulse Oximetry - Multiple Charge Pulse Oximetry - Multiple   Pulse 69   Resp 20   Respiratory Evaluation   $ Care Plan Tech Time 15 min

## 2022-01-07 NOTE — HOSPITAL COURSE
1/6/2022  Mr Goldman feels well at rest but becomes tachycardic and dyspneic with any exertion    1/7/2022  Mr Goldman is fine at rest but with any exertion or stress his HR will elevated-A fib with RVR    1/8/2022  Mr Goldman is feeling great with no SOB, JOHNSON, chest pain or palpitations. He has minor fatigue  Case discussed with Dr RAMON Rahman who feels that the patient may be discharged home on Digoxin and Apixaban  No indication for Covd 19 treatment at this point  Pt will follow post covid admit instructions provided  ROS: see above otherwise negative X 9  PE:in no distress HEENT KARYNA EOM moist mucus membranes Neck supple no use of accessory Lungs no crackles wheezes or rhonchi Heart S 1 S 2 RRR in no distress Abdomen BS+ nontender Ext without CC or E pulses 1-2 + Skin no acute finding Neuro no acute sensory or motor deficit

## 2022-01-08 VITALS
BODY MASS INDEX: 20.96 KG/M2 | RESPIRATION RATE: 29 BRPM | WEIGHT: 146.38 LBS | HEART RATE: 82 BPM | OXYGEN SATURATION: 95 % | TEMPERATURE: 98 F | HEIGHT: 70 IN | SYSTOLIC BLOOD PRESSURE: 119 MMHG | DIASTOLIC BLOOD PRESSURE: 80 MMHG

## 2022-01-08 LAB
ALBUMIN SERPL BCP-MCNC: 3.7 G/DL (ref 3.5–5.2)
ALP SERPL-CCNC: 44 U/L (ref 55–135)
ALT SERPL W/O P-5'-P-CCNC: 28 U/L (ref 10–44)
ANION GAP SERPL CALC-SCNC: 9 MMOL/L (ref 8–16)
AST SERPL-CCNC: 30 U/L (ref 10–40)
BILIRUB SERPL-MCNC: 1.1 MG/DL (ref 0.1–1)
BUN SERPL-MCNC: 15 MG/DL (ref 8–23)
CALCIUM SERPL-MCNC: 8.8 MG/DL (ref 8.7–10.5)
CHLORIDE SERPL-SCNC: 104 MMOL/L (ref 95–110)
CO2 SERPL-SCNC: 26 MMOL/L (ref 23–29)
CREAT SERPL-MCNC: 0.9 MG/DL (ref 0.5–1.4)
ERYTHROCYTE [DISTWIDTH] IN BLOOD BY AUTOMATED COUNT: 11.9 % (ref 11.5–14.5)
EST. GFR  (AFRICAN AMERICAN): >60 ML/MIN/1.73 M^2
EST. GFR  (NON AFRICAN AMERICAN): >60 ML/MIN/1.73 M^2
GLUCOSE SERPL-MCNC: 93 MG/DL (ref 70–110)
HCT VFR BLD AUTO: 46.4 % (ref 40–54)
HGB BLD-MCNC: 16 G/DL (ref 14–18)
MAGNESIUM SERPL-MCNC: 2 MG/DL (ref 1.6–2.6)
MCH RBC QN AUTO: 32.3 PG (ref 27–31)
MCHC RBC AUTO-ENTMCNC: 34.5 G/DL (ref 32–36)
MCV RBC AUTO: 94 FL (ref 82–98)
PLATELET # BLD AUTO: 185 K/UL (ref 150–450)
PMV BLD AUTO: 8.9 FL (ref 9.2–12.9)
POTASSIUM SERPL-SCNC: 3.7 MMOL/L (ref 3.5–5.1)
PROT SERPL-MCNC: 6.6 G/DL (ref 6–8.4)
RBC # BLD AUTO: 4.96 M/UL (ref 4.6–6.2)
SODIUM SERPL-SCNC: 139 MMOL/L (ref 136–145)
WBC # BLD AUTO: 4.91 K/UL (ref 3.9–12.7)

## 2022-01-08 PROCEDURE — 85027 COMPLETE CBC AUTOMATED: CPT | Performed by: INTERNAL MEDICINE

## 2022-01-08 PROCEDURE — 94761 N-INVAS EAR/PLS OXIMETRY MLT: CPT

## 2022-01-08 PROCEDURE — 94799 UNLISTED PULMONARY SVC/PX: CPT

## 2022-01-08 PROCEDURE — 63600175 PHARM REV CODE 636 W HCPCS: Performed by: INTERNAL MEDICINE

## 2022-01-08 PROCEDURE — 80053 COMPREHEN METABOLIC PANEL: CPT | Performed by: INTERNAL MEDICINE

## 2022-01-08 PROCEDURE — 25000003 PHARM REV CODE 250

## 2022-01-08 PROCEDURE — 36415 COLL VENOUS BLD VENIPUNCTURE: CPT | Performed by: INTERNAL MEDICINE

## 2022-01-08 PROCEDURE — 83735 ASSAY OF MAGNESIUM: CPT | Performed by: INTERNAL MEDICINE

## 2022-01-08 PROCEDURE — 63600175 PHARM REV CODE 636 W HCPCS: Performed by: GENERAL PRACTICE

## 2022-01-08 PROCEDURE — 99900035 HC TECH TIME PER 15 MIN (STAT)

## 2022-01-08 RX ORDER — DIGOXIN 250 MCG
250 TABLET ORAL DAILY
Qty: 30 TABLET | Refills: 2 | Status: SHIPPED | OUTPATIENT
Start: 2022-01-08 | End: 2022-02-21

## 2022-01-08 RX ORDER — GUAIFENESIN/DEXTROMETHORPHAN 100-10MG/5
10 SYRUP ORAL EVERY 4 HOURS PRN
Refills: 0
Start: 2022-01-08 | End: 2022-01-18

## 2022-01-08 RX ADMIN — DIGOXIN 250 MCG: 250 INJECTION, SOLUTION INTRAMUSCULAR; INTRAVENOUS; PARENTERAL at 01:01

## 2022-01-08 RX ADMIN — DIGOXIN 250 MCG: 250 INJECTION, SOLUTION INTRAMUSCULAR; INTRAVENOUS; PARENTERAL at 06:01

## 2022-01-08 RX ADMIN — ENOXAPARIN SODIUM 70 MG: 80 INJECTION SUBCUTANEOUS at 08:01

## 2022-01-08 RX ADMIN — MUPIROCIN: 20 OINTMENT TOPICAL at 08:01

## 2022-01-08 RX ADMIN — CHLORHEXIDINE GLUCONATE 15 ML: 1.2 RINSE ORAL at 08:01

## 2022-01-08 NOTE — PLAN OF CARE
01/08/22 0840   Patient Assessment/Suction   Level of Consciousness (AVPU) alert   Respiratory Effort Normal;Unlabored   Expansion/Accessory Muscles/Retractions no use of accessory muscles;no retractions   All Lung Fields Breath Sounds diminished   PRE-TX-O2   O2 Device (Oxygen Therapy) room air   SpO2 95 %   Pulse Oximetry Type Continuous   $ Pulse Oximetry - Multiple Charge Pulse Oximetry - Multiple   Pulse 85   Resp 14   Respiratory Evaluation   $ Care Plan Tech Time 30 min   $ Eval/Re-eval Charges Re-evaluation

## 2022-01-08 NOTE — PROGRESS NOTES
Pt has been encouraged to use urinal for accurate measurement of urine output, but continues to use toilet.

## 2022-01-08 NOTE — DISCHARGE SUMMARY
[facility] Medicine  Discharge Summary      Patient Name: Les Goldman  MRN: [id number]  Patient Class: IP- Inpatient  Admission Date: 1/5/2022  Hospital Length of Stay: 2 days  Discharge Date and Time:  01/08/2022 2:04 PM  Attending Physician: George Nunes MD   Discharging Provider: George Nunes MD  Primary Care Provider: Primary Doctor No      HPI:   Patient is a 66-year-old  male with known history of essential hypertension who was sent to the ED from Urgent Care for new-onset atrial fibrillation with RVR.      Patient reports being in his usual state of health until New Year's Kelsie when he noticed symptoms suggestive of COVID-19 which include exertional shortness of breath, nonproductive cough, fatigue, abdominal pain and diarrhea.  These symptoms have subsided since.  He is currently asymptomatic except for mild fatigue.  He went to get tested at an urgent care to check if he was negative.  However he was noted to be in atrial fibrillation with RVR with heart rate in 170s and was sent here.  Patient denies chest pain, palpitations, lightheadedness/dizziness or shortness of breath.    Patient reports getting his booster COVID-19 Pfizer vaccine 2 days ago (01/03).  He already has been vaccinated with Moderna vaccine in February/March of 2021. Patient strongly believes that his atrial fibrillation was secondary to the vaccine.  No cardiac symptoms following Moderna vaccine.    Patient is on amlodipine 10 mg and olmesartan 40 mg daily for essential hypertension.  Reports compliance with medications.    Rest of the 10 point review of systems is negative except as mentioned above.        * No surgery found *      Hospital Course:   1/6/2022  Mr Goldman feels well at rest but becomes tachycardic and dyspneic with any exertion    1/7/2022  Mr Goldman is fine at rest but with any exertion or stress his HR will elevated-A fib with RVR    1/8/2022  Mr Goldman is feeling great  with no SOB, JOHNSON, chest pain or palpitations. He has minor fatigue  Case discussed with Dr RAMON Rahman who feels that the patient may be discharged home on Digoxin and Apixaban  No indication for Covd 19 treatment at this point  Pt will follow post covid admit instructions provided  ROS: see above otherwise negative X 9  PE:in no distress HEENT KARYNA EOM moist mucus membranes Neck supple no use of accessory Lungs no crackles wheezes or rhonchi Heart S 1 S 2 RRR in no distress Abdomen BS+ nontender Ext without CC or E pulses 1-2 + Skin no acute finding Neuro no acute sensory or motor deficit         Goals of Care Treatment Preferences:  Code Status: Full Code      Consults:   Consults (From admission, onward)        Status Ordering Provider     Inpatient consult to Cardiology  Once        Provider:  Brian Rahman MD    Completed YUNG SMITH     Inpatient consult to Hospitalist  Once        Provider:  George Garcia MD    Acknowledged YUNG SMITH          No new Assessment & Plan notes have been filed under this hospital service since the last note was generated.  Service: Hospital Medicine    Final Active Diagnoses:    Diagnosis Date Noted POA    PRINCIPAL PROBLEM:  Atrial fibrillation with RVR [I48.91] 01/05/2022 Yes    COVID-19 [U07.1] 01/05/2022 Yes    Primary hypertension [I10] 01/05/2022 Yes      Problems Resolved During this Admission:       Discharged Condition: good    Disposition: Home or Self Care    Follow Up:   Follow-up Information     Primary Doctor No.           Brian Rahman MD In 2 weeks.    Specialties: INTERVENTIONAL CARDIOLOGY, Cardiology, Cardiovascular Disease  Contact information:  [address] [place] [address]  [address] [place] [id number]  [phone]                       Patient Instructions:      Basic metabolic panel   Standing Status: Future Standing Exp. Date: 03/09/23     Magnesium   Standing Status: Future Standing Exp. Date: 03/09/23     Digoxin level   Standing Status:  Future Standing Exp. Date: 03/09/23     Activity as tolerated       Significant Diagnostic Studies: Labs:   BMP:   Recent Labs   Lab 01/07/22  0353 01/08/22  0328   GLU 94 93    139   K 3.8 3.7    104   CO2 28 26   BUN 14 15   CREATININE 0.9 0.9   CALCIUM 8.7 8.8   MG 2.0 2.0   , CMP   Recent Labs   Lab 01/07/22  0353 01/08/22  0328    139   K 3.8 3.7    104   CO2 28 26   GLU 94 93   BUN 14 15   CREATININE 0.9 0.9   CALCIUM 8.7 8.8   PROT 6.5 6.6   ALBUMIN 3.6 3.7   BILITOT 1.0 1.1*   ALKPHOS 43* 44*   AST 20 30   ALT 22 28   ANIONGAP 9 9   ESTGFRAFRICA >60.0 >60.0   EGFRNONAA >60.0 >60.0   , CBC   Recent Labs   Lab 01/07/22  0353 01/07/22  0353 01/08/22  0328   WBC 4.31  --  4.91   HGB 14.9  --  16.0   HCT 43.4   < > 46.4     --  185    < > = values in this interval not displayed.   , INR   Lab Results   Component Value Date    INR 1.1 01/05/2022   , Troponin   Recent Labs   Lab 01/05/22  1527   TROPONINI <0.030    and All labs within the past 24 hours have been reviewed  Microbiology: Blood Culture No results found for: LABBLOO    Pending Diagnostic Studies:     None         Medications:  Reconciled Home Medications:      Medication List      START taking these medications    apixaban 5 mg Tab  Commonly known as: ELIQUIS  Take 1 tablet (5 mg total) by mouth 2 (two) times daily.     dextromethorphan-guaiFENesin  mg/5 ml  mg/5 mL liquid  Commonly known as: ROBITUSSIN-DM  Take 10 mLs by mouth every 4 (four) hours as needed.     digoxin 250 mcg tablet  Commonly known as: LANOXIN  Take 1 tablet (250 mcg total) by mouth once daily. 1 week            Indwelling Lines/Drains at time of discharge:   Lines/Drains/Airways     None                 Time spent on the discharge of patient: 34 minutes         George Nunes MD  Department of Hospital Medicine  [facility]

## 2022-01-09 NOTE — PLAN OF CARE
01/09/22 0056   Final Note   Assessment Type Final Discharge Note   Anticipated Discharge Disposition Home       Discharge orders reviewed. No case management/discharge planning needs noted.

## 2022-01-09 NOTE — PROGRESS NOTES
Call from pt's pharmacy  Pt is not covered for apixaban  Choices are warfarin and Xarelto  A/P  Discussed with Dr RAMON Rahman cardiology and he feels that Xarelto would be the best choice as pt has Covid 19 and PAF   Xarelto 20 mg 1 po Q am

## 2022-01-28 ENCOUNTER — TELEPHONE (OUTPATIENT)
Dept: CARDIOLOGY | Facility: CLINIC | Age: 67
End: 2022-01-28
Payer: MEDICARE

## 2022-01-28 NOTE — TELEPHONE ENCOUNTER
----- Message from Tania Metzger MA sent at 1/28/2022  2:56 PM CST -----  Contact: JANETH JOHNSON [[id number]]  Type: Needs Medical Advice    Who Called:JANETH JOHNSON [[id number]]  Best Call Back Number: [phone]  Inquiry/Question: Would you kindly call JANETH JOHNSON [[id number]] regarding 2 weekk follow up from ER  Thank you~

## 2022-01-28 NOTE — TELEPHONE ENCOUNTER
----- Message from Can Banda sent at 1/28/2022  3:43 PM CST -----  Contact: pt  [phone]  Pt is needing call back to confirm when his hos f/u is I cant see it in chart thanks

## 2022-02-01 ENCOUNTER — TELEPHONE (OUTPATIENT)
Dept: CARDIOLOGY | Facility: CLINIC | Age: 67
End: 2022-02-01
Payer: MEDICARE

## 2022-02-01 ENCOUNTER — HOSPITAL ENCOUNTER (INPATIENT)
Facility: HOSPITAL | Age: 67
LOS: 1 days | Discharge: HOME | DRG: 310 | End: 2022-02-02
Attending: EMERGENCY MEDICINE | Admitting: INTERNAL MEDICINE
Payer: MEDICARE

## 2022-02-01 DIAGNOSIS — I48.91 ATRIAL FIBRILLATION WITH RVR: ICD-10-CM

## 2022-02-01 DIAGNOSIS — I48.91 ATRIAL FIBRILLATION: ICD-10-CM

## 2022-02-01 LAB
ALBUMIN SERPL BCP-MCNC: 4.7 G/DL (ref 3.5–5.2)
ALP SERPL-CCNC: 47 U/L (ref 55–135)
ALT SERPL W/O P-5'-P-CCNC: 23 U/L (ref 10–44)
ANION GAP SERPL CALC-SCNC: 11 MMOL/L (ref 8–16)
APTT PPP: 34.9 SEC (ref 23.3–35.1)
AST SERPL-CCNC: 30 U/L (ref 10–40)
BASOPHILS # BLD AUTO: 0.03 K/UL (ref 0–0.2)
BASOPHILS NFR BLD: 0.4 % (ref 0–1.9)
BILIRUB SERPL-MCNC: 1.5 MG/DL (ref 0.1–1)
BILIRUB UR QL STRIP: NEGATIVE
BNP SERPL-MCNC: 155 PG/ML (ref 0–99)
BUN SERPL-MCNC: 12 MG/DL (ref 8–23)
CALCIUM SERPL-MCNC: 9.4 MG/DL (ref 8.7–10.5)
CHLORIDE SERPL-SCNC: 100 MMOL/L (ref 95–110)
CK MB SERPL-MCNC: 1.5 NG/ML (ref 0.1–6.5)
CK SERPL-CCNC: 69 U/L (ref 20–200)
CLARITY UR: CLEAR
CO2 SERPL-SCNC: 26 MMOL/L (ref 23–29)
COLOR UR: YELLOW
CREAT SERPL-MCNC: 0.8 MG/DL (ref 0.5–1.4)
CRP SERPL-MCNC: 0.12 MG/DL
DIFFERENTIAL METHOD: ABNORMAL
DIGOXIN SERPL-MCNC: 0.9 NG/ML (ref 0.8–2)
EOSINOPHIL # BLD AUTO: 0 K/UL (ref 0–0.5)
EOSINOPHIL NFR BLD: 0.6 % (ref 0–8)
ERYTHROCYTE [DISTWIDTH] IN BLOOD BY AUTOMATED COUNT: 12.1 % (ref 11.5–14.5)
ERYTHROCYTE [SEDIMENTATION RATE] IN BLOOD BY WESTERGREN METHOD: 5 MM/HR (ref 0–10)
EST. GFR  (AFRICAN AMERICAN): >60 ML/MIN/1.73 M^2
EST. GFR  (NON AFRICAN AMERICAN): >60 ML/MIN/1.73 M^2
FERRITIN SERPL-MCNC: 209 NG/ML (ref 20–300)
GLUCOSE SERPL-MCNC: 106 MG/DL (ref 70–110)
GLUCOSE UR QL STRIP: NEGATIVE
HCT VFR BLD AUTO: 47.2 % (ref 40–54)
HGB BLD-MCNC: 16.4 G/DL (ref 14–18)
HGB UR QL STRIP: NEGATIVE
IMM GRANULOCYTES # BLD AUTO: 0.01 K/UL (ref 0–0.04)
IMM GRANULOCYTES NFR BLD AUTO: 0.1 % (ref 0–0.5)
INR PPP: 2
KETONES UR QL STRIP: ABNORMAL
LEUKOCYTE ESTERASE UR QL STRIP: NEGATIVE
LYMPHOCYTES # BLD AUTO: 1.1 K/UL (ref 1–4.8)
LYMPHOCYTES NFR BLD: 15.5 % (ref 18–48)
MAGNESIUM SERPL-MCNC: 2 MG/DL (ref 1.6–2.6)
MCH RBC QN AUTO: 32.3 PG (ref 27–31)
MCHC RBC AUTO-ENTMCNC: 34.7 G/DL (ref 32–36)
MCV RBC AUTO: 93 FL (ref 82–98)
MONOCYTES # BLD AUTO: 0.7 K/UL (ref 0.3–1)
MONOCYTES NFR BLD: 10.6 % (ref 4–15)
NEUTROPHILS # BLD AUTO: 5.1 K/UL (ref 1.8–7.7)
NEUTROPHILS NFR BLD: 72.8 % (ref 38–73)
NITRITE UR QL STRIP: NEGATIVE
NRBC BLD-RTO: 0 /100 WBC
PH UR STRIP: 7 [PH] (ref 5–8)
PLATELET # BLD AUTO: 178 K/UL (ref 150–450)
PMV BLD AUTO: 9.5 FL (ref 9.2–12.9)
POTASSIUM SERPL-SCNC: 3.6 MMOL/L (ref 3.5–5.1)
PROCALCITONIN SERPL IA-MCNC: <0.05 NG/ML (ref 0–0.5)
PROT SERPL-MCNC: 7.9 G/DL (ref 6–8.4)
PROT UR QL STRIP: NEGATIVE
PROTHROMBIN TIME: 21.7 SEC (ref 11.4–13.7)
RBC # BLD AUTO: 5.07 M/UL (ref 4.6–6.2)
SARS-COV-2 RDRP RESP QL NAA+PROBE: NEGATIVE
SODIUM SERPL-SCNC: 137 MMOL/L (ref 136–145)
SP GR UR STRIP: 1.01 (ref 1–1.03)
T4 FREE SERPL-MCNC: 1.04 NG/DL (ref 0.71–1.51)
TROPONIN I SERPL DL<=0.01 NG/ML-MCNC: <0.03 NG/ML
TROPONIN I SERPL DL<=0.01 NG/ML-MCNC: <0.03 NG/ML
TSH SERPL DL<=0.005 MIU/L-ACNC: 2.87 UIU/ML (ref 0.34–5.6)
URN SPEC COLLECT METH UR: ABNORMAL
UROBILINOGEN UR STRIP-ACNC: NEGATIVE EU/DL
WBC # BLD AUTO: 6.99 K/UL (ref 3.9–12.7)

## 2022-02-01 PROCEDURE — 99291 CRITICAL CARE FIRST HOUR: CPT

## 2022-02-01 PROCEDURE — 21400001 HC TELEMETRY ROOM

## 2022-02-01 PROCEDURE — 83735 ASSAY OF MAGNESIUM: CPT | Performed by: EMERGENCY MEDICINE

## 2022-02-01 PROCEDURE — 93010 ELECTROCARDIOGRAM REPORT: CPT | Mod: ,,, | Performed by: INTERNAL MEDICINE

## 2022-02-01 PROCEDURE — 82550 ASSAY OF CK (CPK): CPT | Performed by: EMERGENCY MEDICINE

## 2022-02-01 PROCEDURE — 83880 ASSAY OF NATRIURETIC PEPTIDE: CPT | Performed by: EMERGENCY MEDICINE

## 2022-02-01 PROCEDURE — 84145 PROCALCITONIN (PCT): CPT | Performed by: NURSE PRACTITIONER

## 2022-02-01 PROCEDURE — 96365 THER/PROPH/DIAG IV INF INIT: CPT

## 2022-02-01 PROCEDURE — 82553 CREATINE MB FRACTION: CPT | Performed by: EMERGENCY MEDICINE

## 2022-02-01 PROCEDURE — 86140 C-REACTIVE PROTEIN: CPT | Performed by: NURSE PRACTITIONER

## 2022-02-01 PROCEDURE — 81003 URINALYSIS AUTO W/O SCOPE: CPT | Performed by: EMERGENCY MEDICINE

## 2022-02-01 PROCEDURE — 84443 ASSAY THYROID STIM HORMONE: CPT | Performed by: NURSE PRACTITIONER

## 2022-02-01 PROCEDURE — 84439 ASSAY OF FREE THYROXINE: CPT | Performed by: NURSE PRACTITIONER

## 2022-02-01 PROCEDURE — 85651 RBC SED RATE NONAUTOMATED: CPT | Performed by: NURSE PRACTITIONER

## 2022-02-01 PROCEDURE — 84484 ASSAY OF TROPONIN QUANT: CPT | Performed by: EMERGENCY MEDICINE

## 2022-02-01 PROCEDURE — 85730 THROMBOPLASTIN TIME PARTIAL: CPT | Performed by: EMERGENCY MEDICINE

## 2022-02-01 PROCEDURE — 93005 ELECTROCARDIOGRAM TRACING: CPT | Performed by: INTERNAL MEDICINE

## 2022-02-01 PROCEDURE — 25000003 PHARM REV CODE 250: Performed by: EMERGENCY MEDICINE

## 2022-02-01 PROCEDURE — 85610 PROTHROMBIN TIME: CPT | Performed by: EMERGENCY MEDICINE

## 2022-02-01 PROCEDURE — 84484 ASSAY OF TROPONIN QUANT: CPT | Mod: 91 | Performed by: NURSE PRACTITIONER

## 2022-02-01 PROCEDURE — 80162 ASSAY OF DIGOXIN TOTAL: CPT | Performed by: EMERGENCY MEDICINE

## 2022-02-01 PROCEDURE — 85025 COMPLETE CBC W/AUTO DIFF WBC: CPT | Performed by: EMERGENCY MEDICINE

## 2022-02-01 PROCEDURE — 82728 ASSAY OF FERRITIN: CPT | Performed by: NURSE PRACTITIONER

## 2022-02-01 PROCEDURE — 96366 THER/PROPH/DIAG IV INF ADDON: CPT

## 2022-02-01 PROCEDURE — 80053 COMPREHEN METABOLIC PANEL: CPT | Performed by: EMERGENCY MEDICINE

## 2022-02-01 PROCEDURE — U0002 COVID-19 LAB TEST NON-CDC: HCPCS | Performed by: EMERGENCY MEDICINE

## 2022-02-01 PROCEDURE — 93010 EKG 12-LEAD: ICD-10-PCS | Mod: ,,, | Performed by: INTERNAL MEDICINE

## 2022-02-01 RX ORDER — DILTIAZEM HYDROCHLORIDE 5 MG/ML
10 INJECTION INTRAVENOUS
Status: COMPLETED | OUTPATIENT
Start: 2022-02-01 | End: 2022-02-01

## 2022-02-01 RX ORDER — FAMOTIDINE 10 MG/ML
20 INJECTION INTRAVENOUS DAILY
Status: DISCONTINUED | OUTPATIENT
Start: 2022-02-02 | End: 2022-02-02 | Stop reason: HOSPADM

## 2022-02-01 RX ORDER — ONDANSETRON 2 MG/ML
4 INJECTION INTRAMUSCULAR; INTRAVENOUS EVERY 8 HOURS PRN
Status: DISCONTINUED | OUTPATIENT
Start: 2022-02-01 | End: 2022-02-02 | Stop reason: HOSPADM

## 2022-02-01 RX ORDER — AMLODIPINE BESYLATE 5 MG/1
10 TABLET ORAL DAILY
Status: DISCONTINUED | OUTPATIENT
Start: 2022-02-02 | End: 2022-02-02

## 2022-02-01 RX ORDER — VALSARTAN 40 MG/1
40 TABLET ORAL DAILY
Status: DISCONTINUED | OUTPATIENT
Start: 2022-02-02 | End: 2022-02-02 | Stop reason: HOSPADM

## 2022-02-01 RX ORDER — AMLODIPINE BESYLATE 10 MG/1
10 TABLET ORAL DAILY
COMMUNITY
End: 2023-08-17 | Stop reason: SDUPTHER

## 2022-02-01 RX ORDER — SODIUM CHLORIDE 0.9 % (FLUSH) 0.9 %
10 SYRINGE (ML) INJECTION
Status: DISCONTINUED | OUTPATIENT
Start: 2022-02-01 | End: 2022-02-02 | Stop reason: HOSPADM

## 2022-02-01 RX ORDER — DIGOXIN 250 MCG
250 TABLET ORAL DAILY
Status: DISCONTINUED | OUTPATIENT
Start: 2022-02-02 | End: 2022-02-02

## 2022-02-01 RX ORDER — MORPHINE SULFATE 2 MG/ML
1 INJECTION, SOLUTION INTRAMUSCULAR; INTRAVENOUS EVERY 6 HOURS PRN
Status: DISCONTINUED | OUTPATIENT
Start: 2022-02-01 | End: 2022-02-02 | Stop reason: HOSPADM

## 2022-02-01 RX ORDER — TALC
6 POWDER (GRAM) TOPICAL NIGHTLY PRN
Status: DISCONTINUED | OUTPATIENT
Start: 2022-02-01 | End: 2022-02-02 | Stop reason: HOSPADM

## 2022-02-01 RX ORDER — ACETAMINOPHEN 325 MG/1
650 TABLET ORAL EVERY 8 HOURS PRN
Status: DISCONTINUED | OUTPATIENT
Start: 2022-02-01 | End: 2022-02-02 | Stop reason: HOSPADM

## 2022-02-01 RX ORDER — OLMESARTAN MEDOXOMIL 40 MG/1
40 TABLET ORAL DAILY
COMMUNITY
End: 2023-08-17 | Stop reason: SDUPTHER

## 2022-02-01 RX ADMIN — DILTIAZEM HYDROCHLORIDE 10 MG: 5 INJECTION INTRAVENOUS at 01:02

## 2022-02-01 RX ADMIN — DEXTROSE MONOHYDRATE 2.5 MG/HR: 50 INJECTION, SOLUTION INTRAVENOUS at 01:02

## 2022-02-01 NOTE — ED PROVIDER NOTES
Encounter Date: 2/1/2022       History     Chief Complaint   Patient presents with    Palpitations     Started this morning. Current heart rate is 162 bpm. Denies chest pain     Patient with recent diagnosis of COVID-19 infection associated with new onset atrial fibrillation with RVR.  Patient went to his dermatologist appointment and noted his heart rate was 170. Patient continue with some mild fatigue after his COVID infection.  His no fever chills.  No shortness of breath.  No pleurisy hemoptysis.  Patient was not aware he was so tachycardic.          Review of patient's allergies indicates:  No Known Allergies  No past medical history on file.  No past surgical history on file.  No family history on file.     Review of Systems   Constitutional: Negative for chills and fever.   HENT: Positive for congestion. Negative for sore throat.    Eyes: Negative for photophobia and visual disturbance.   Respiratory: Negative for shortness of breath.    Cardiovascular: Negative for chest pain.   Gastrointestinal: Negative for abdominal pain and vomiting.   Genitourinary: Negative for dysuria.   Musculoskeletal: Negative for joint swelling.   Skin: Negative for rash.   Neurological: Negative for weakness and headaches.   Psychiatric/Behavioral: Negative for confusion.       Physical Exam     Initial Vitals [02/01/22 1315]   BP Pulse Resp Temp SpO2   125/86 (!) 162 14 98.4 °F (36.9 °C) 96 %      MAP       --         Physical Exam    Nursing note and vitals reviewed.  Constitutional: He is not diaphoretic. No distress.   HENT:   Head: Normocephalic and atraumatic.   Eyes: Conjunctivae are normal.   Neck:   Normal range of motion.  Cardiovascular:   Tachycardic, irregular rate and rhythm   Pulmonary/Chest: Breath sounds normal.   Abdominal: Abdomen is soft. There is no abdominal tenderness.   Musculoskeletal:         General: Normal range of motion.      Cervical back: Normal range of motion.     Skin: No rash noted.    Psychiatric: He has a normal mood and affect.         ED Course   Critical Care    Date/Time: 2/1/2022 2:02 PM  Performed by: Kwadwo Schuler MD  Authorized by: Kwadwo Schuler MD   Direct patient critical care time: 15 minutes  Additional history critical care time: 5 minutes  Ordering / reviewing critical care time: 5 minutes  Documentation critical care time: 5 minutes  Consulting other physicians critical care time: 5 minutes  Total critical care time (exclusive of procedural time) : 35 minutes        Labs Reviewed   PROTIME-INR - Abnormal; Notable for the following components:       Result Value    PT 21.7 (*)     All other components within normal limits   CBC W/ AUTO DIFFERENTIAL - Abnormal; Notable for the following components:    MCH 32.3 (*)     Lymph % 15.5 (*)     All other components within normal limits   COMPREHENSIVE METABOLIC PANEL - Abnormal; Notable for the following components:    Total Bilirubin 1.5 (*)     Alkaline Phosphatase 47 (*)     All other components within normal limits   APTT   CK-MB   CK   MAGNESIUM   TROPONIN I   DIGOXIN LEVEL   URINALYSIS, REFLEX TO URINE CULTURE   B-TYPE NATRIURETIC PEPTIDE        ECG Results          EKG 12-lead (In process)  Result time 02/01/22 13:51:42    In process by Interface, Lab In [facility] (02/01/22 13:51:42)                 Narrative:    Test Reason : I48.91,    Vent. Rate : 154 BPM     Atrial Rate : 174 BPM     P-R Int : 000 ms          QRS Dur : 078 ms      QT Int : 282 ms       P-R-T Axes : 000 059 246 degrees     QTc Int : 451 ms    Atrial fibrillation with rapid ventricular response  Anterior infarct (cited on or before 05-JAN-2022)  ST and T wave abnormality, consider inferolateral ischemia  Abnormal ECG  When compared with ECG of 05-JAN-2022 14:55,  Serial changes of Anterior infarct Present    Referred By: [email or web address]   SELF           Confirmed By:                             Imaging Results          X-Ray Chest AP Portable (Final result)   Result time 02/01/22 13:42:28    Final result by Umer Sam MD (02/01/22 13:42:28)                 Narrative:    Reason: Atrial fibrillation with RVR Afib with rvr    FINDINGS:  Portable chest at 1334 compared with 1/5/2022 shows normal cardiomediastinal silhouette.    Lungs are clear. Pulmonary vasculature is normal. No acute osseous abnormality.    IMPRESSION:  Negative chest.    Electronically signed by:  Umer Sam MD  2/1/2022 1:42 PM CST Workstation: [id number]                               Medications   diltiaZEM 100 mg in dextrose 5% 100 mL IVPB (ready to mix system) (titrating) (2.5 mg/hr Intravenous New Bag 2/1/22 1327)   diltiaZEM injection 10 mg (10 mg Intravenous Given 2/1/22 1322)     Medical Decision Making:   History:   Old Medical Records: I decided to obtain old medical records.  Clinical Tests:   Lab Tests: Reviewed  Radiological Study: Reviewed  Medical Tests: Reviewed  ED Management:  Patient presents patient fibrillation with RVR.  Patient needed IV Cardizem bolus and now on infusion.  Patient continues to be in atrial fibrillation but rate controlled in the 90s.  Blood pressure stable.  Hospitalist consulted for admission.                      Clinical Impression:   Final diagnoses:  [I48.91] Atrial fibrillation with RVR          ED Disposition Condition    Admit               Kwadwo Schuler MD  02/01/22 [id number]

## 2022-02-01 NOTE — TELEPHONE ENCOUNTER
----- Message from Salty Castillo sent at 2/1/2022 11:19 AM CST -----  Contact: Dr nasir cotton  Type: Needs Medical Advice  Who Called: Dr nasir cotton    Best Call Back Number:[phone]  Additional Information: is calling about pt having afib and would like a call back

## 2022-02-01 NOTE — TELEPHONE ENCOUNTER
Spoke with Dr. Cuellar. Pt called due to not feeling well checked ekg in afib rate of 160. Dr told him to get a ride to ER/ Pt is on his way to [facility]. Dr. Cuellar said he is asymptomatic but just wanted him checked out.

## 2022-02-02 VITALS
OXYGEN SATURATION: 96 % | HEIGHT: 70 IN | BODY MASS INDEX: 20.76 KG/M2 | HEART RATE: 157 BPM | DIASTOLIC BLOOD PRESSURE: 92 MMHG | TEMPERATURE: 98 F | WEIGHT: 145 LBS | RESPIRATION RATE: 17 BRPM | SYSTOLIC BLOOD PRESSURE: 124 MMHG

## 2022-02-02 LAB
ALBUMIN SERPL BCP-MCNC: 4 G/DL (ref 3.5–5.2)
ALP SERPL-CCNC: 43 U/L (ref 55–135)
ALT SERPL W/O P-5'-P-CCNC: 20 U/L (ref 10–44)
ANION GAP SERPL CALC-SCNC: 11 MMOL/L (ref 8–16)
AST SERPL-CCNC: 24 U/L (ref 10–40)
BASOPHILS # BLD AUTO: 0.02 K/UL (ref 0–0.2)
BASOPHILS NFR BLD: 0.4 % (ref 0–1.9)
BILIRUB SERPL-MCNC: 1.3 MG/DL (ref 0.1–1)
BUN SERPL-MCNC: 14 MG/DL (ref 8–23)
CALCIUM SERPL-MCNC: 8.9 MG/DL (ref 8.7–10.5)
CHLORIDE SERPL-SCNC: 102 MMOL/L (ref 95–110)
CO2 SERPL-SCNC: 26 MMOL/L (ref 23–29)
CREAT SERPL-MCNC: 0.8 MG/DL (ref 0.5–1.4)
DIFFERENTIAL METHOD: ABNORMAL
EOSINOPHIL # BLD AUTO: 0.1 K/UL (ref 0–0.5)
EOSINOPHIL NFR BLD: 2.7 % (ref 0–8)
ERYTHROCYTE [DISTWIDTH] IN BLOOD BY AUTOMATED COUNT: 12.2 % (ref 11.5–14.5)
EST. GFR  (AFRICAN AMERICAN): >60 ML/MIN/1.73 M^2
EST. GFR  (NON AFRICAN AMERICAN): >60 ML/MIN/1.73 M^2
GLUCOSE SERPL-MCNC: 92 MG/DL (ref 70–110)
HCT VFR BLD AUTO: 43.5 % (ref 40–54)
HGB BLD-MCNC: 15.4 G/DL (ref 14–18)
IMM GRANULOCYTES # BLD AUTO: 0.01 K/UL (ref 0–0.04)
IMM GRANULOCYTES NFR BLD AUTO: 0.2 % (ref 0–0.5)
LYMPHOCYTES # BLD AUTO: 1.5 K/UL (ref 1–4.8)
LYMPHOCYTES NFR BLD: 28.7 % (ref 18–48)
MCH RBC QN AUTO: 31.8 PG (ref 27–31)
MCHC RBC AUTO-ENTMCNC: 35.4 G/DL (ref 32–36)
MCV RBC AUTO: 90 FL (ref 82–98)
MONOCYTES # BLD AUTO: 0.7 K/UL (ref 0.3–1)
MONOCYTES NFR BLD: 12.8 % (ref 4–15)
NEUTROPHILS # BLD AUTO: 2.8 K/UL (ref 1.8–7.7)
NEUTROPHILS NFR BLD: 55.2 % (ref 38–73)
NRBC BLD-RTO: 0 /100 WBC
PLATELET # BLD AUTO: 167 K/UL (ref 150–450)
PMV BLD AUTO: 9.3 FL (ref 9.2–12.9)
POTASSIUM SERPL-SCNC: 3.7 MMOL/L (ref 3.5–5.1)
PROT SERPL-MCNC: 6.5 G/DL (ref 6–8.4)
RBC # BLD AUTO: 4.85 M/UL (ref 4.6–6.2)
SODIUM SERPL-SCNC: 139 MMOL/L (ref 136–145)
TROPONIN I SERPL DL<=0.01 NG/ML-MCNC: <0.03 NG/ML
WBC # BLD AUTO: 5.15 K/UL (ref 3.9–12.7)

## 2022-02-02 PROCEDURE — 93005 ELECTROCARDIOGRAM TRACING: CPT | Performed by: INTERNAL MEDICINE

## 2022-02-02 PROCEDURE — 93010 EKG 12-LEAD: ICD-10-PCS | Mod: ,,, | Performed by: INTERNAL MEDICINE

## 2022-02-02 PROCEDURE — 99223 PR INITIAL HOSPITAL CARE,LEVL III: ICD-10-PCS | Mod: ,,, | Performed by: GENERAL PRACTICE

## 2022-02-02 PROCEDURE — 25000003 PHARM REV CODE 250: Performed by: GENERAL PRACTICE

## 2022-02-02 PROCEDURE — 93010 ELECTROCARDIOGRAM REPORT: CPT | Mod: 76,,, | Performed by: INTERNAL MEDICINE

## 2022-02-02 PROCEDURE — 25000003 PHARM REV CODE 250: Performed by: NURSE PRACTITIONER

## 2022-02-02 PROCEDURE — 85025 COMPLETE CBC W/AUTO DIFF WBC: CPT | Performed by: NURSE PRACTITIONER

## 2022-02-02 PROCEDURE — 90670 PCV13 VACCINE IM: CPT | Performed by: FAMILY MEDICINE

## 2022-02-02 PROCEDURE — G0009 ADMIN PNEUMOCOCCAL VACCINE: HCPCS | Performed by: FAMILY MEDICINE

## 2022-02-02 PROCEDURE — 99223 1ST HOSP IP/OBS HIGH 75: CPT | Mod: ,,, | Performed by: GENERAL PRACTICE

## 2022-02-02 PROCEDURE — 80053 COMPREHEN METABOLIC PANEL: CPT | Performed by: NURSE PRACTITIONER

## 2022-02-02 PROCEDURE — 93010 ELECTROCARDIOGRAM REPORT: CPT | Mod: ,,, | Performed by: INTERNAL MEDICINE

## 2022-02-02 PROCEDURE — 84484 ASSAY OF TROPONIN QUANT: CPT | Performed by: NURSE PRACTITIONER

## 2022-02-02 PROCEDURE — 63600175 PHARM REV CODE 636 W HCPCS: Performed by: FAMILY MEDICINE

## 2022-02-02 PROCEDURE — 90471 IMMUNIZATION ADMIN: CPT | Performed by: FAMILY MEDICINE

## 2022-02-02 RX ORDER — AMIODARONE HYDROCHLORIDE 200 MG/1
400 TABLET ORAL DAILY
Qty: 60 TABLET | Refills: 0 | Status: SHIPPED | OUTPATIENT
Start: 2022-02-02 | End: 2022-02-18

## 2022-02-02 RX ORDER — AMIODARONE HYDROCHLORIDE 200 MG/1
400 TABLET ORAL DAILY
Status: DISCONTINUED | OUTPATIENT
Start: 2022-02-02 | End: 2022-02-02 | Stop reason: HOSPADM

## 2022-02-02 RX ADMIN — FAMOTIDINE 20 MG: 10 INJECTION, SOLUTION INTRAVENOUS at 09:02

## 2022-02-02 RX ADMIN — PNEUMOCOCCAL 13-VALENT CONJUGATE VACCINE 0.5 ML: 2.2; 2.2; 2.2; 2.2; 2.2; 4.4; 2.2; 2.2; 2.2; 2.2; 2.2; 2.2; 2.2 INJECTION, SUSPENSION INTRAMUSCULAR at 03:02

## 2022-02-02 RX ADMIN — VALSARTAN 40 MG: 40 TABLET, FILM COATED ORAL at 09:02

## 2022-02-02 RX ADMIN — DIGOXIN 250 MCG: 250 TABLET ORAL at 09:02

## 2022-02-02 RX ADMIN — AMIODARONE HYDROCHLORIDE 400 MG: 200 TABLET ORAL at 01:02

## 2022-02-02 NOTE — ED NOTES
Bed: 29  Expected date:   Expected time:   Means of arrival:   Comments:  ED 4  
Pt complains of palpitations that began today. Pt denies pain or any other symptoms, pt is aao x4, safety intact, telemetry present.  
no

## 2022-02-02 NOTE — H&P
[facility] Medicine History & Physical Examination   Patient Name: Les Goldman  MRN: [id number]  Patient Class: IP- Inpatient   Admission Date: 2/1/2022  1:10 PM  Length of Stay: 0  Attending Physician:   Primary Care Provider: Primary Doctor No  Face-to-Face encounter date: 02/01/2022  Code Status: Full Code  MPOA:  Chief Complaint: Palpitations (Started this morning. Current heart rate is 162 bpm. Denies chest pain)        Patient information was obtained from patient, past medical records and ER records.   HISTORY OF PRESENT ILLNESS:   Les Goldman is a 66 y.o. old male who  has a past medical history of Atrial fibrillation with RVR (01/2022), COVID-19 (01/2022), and Hypertension.. The patient presented to [facility] on 2/1/2022 with a primary complaint of Palpitations (Started this morning. Current heart rate is 162 bpm. Denies chest pain)  .   66-year-old male presents to emergency room with complaints of palpitations.  The patient states he was recently admitted to the hospital with COVID-19 infection and new onset atrial fibrillation after receiving a booster injection of the COVID-19 vaccine.  The patient on his last admit was placed on digoxin and Xarelto.  Today while he was at the dermatologist's he noted his rate to be 170 he was having palpitations.  So he came to the emergency room for further evaluation he also had been complaining of fatigue and generalized weakness.  He denied cough shortness of breath or actual chest pain he describes symptoms as moderate in severity with no alleviating or exacerbating factors    Past medical history significant for hypertension COVID-19 infection and atrial fibrillation with RVR    REVIEW OF SYSTEMS:   10 Point Review of System was performed and was found to be negative except for that mentioned already in the HPI and   Review of Systems (Negative unless checked off)  Review of Systems   Constitutional: Positive for  "malaise/fatigue.   HENT: Negative.    Eyes: Negative.    Respiratory: Positive for cough and shortness of breath.    Cardiovascular: Positive for palpitations.   Gastrointestinal: Negative.    Genitourinary: Negative.    Musculoskeletal: Negative.    Skin: Negative.    Neurological: Positive for weakness.   Endo/Heme/Allergies: Negative.    Psychiatric/Behavioral: Negative.            PAST MEDICAL HISTORY:     Past Medical History:   Diagnosis Date    Atrial fibrillation with RVR 01/2022    COVID-19 01/2022    Hypertension        PAST SURGICAL HISTORY:   No past surgical history on file.    ALLERGIES:   Patient has no known allergies.    FAMILY HISTORY:     Family History   Problem Relation Age of Onset    Hypertension Mother     Hypertension Father        SOCIAL HISTORY:     Social History     Tobacco Use    Smoking status: Never Smoker    Smokeless tobacco: Not on file   Substance Use Topics    Alcohol use: Not Currently        Social History     Substance and Sexual Activity   Sexual Activity Not on file        HOME MEDICATIONS:     Prior to Admission medications    Medication Sig Start Date End Date Taking? Authorizing Provider   amLODIPine (NORVASC) 10 MG tablet Take 10 mg by mouth once daily.   Yes Historical Provider   digoxin (LANOXIN) 250 mcg tablet Take 1 tablet (250 mcg total) by mouth once daily. 1 week 1/8/22 4/8/22 Yes George Nunes MD   olmesartan (BENICAR) 40 MG tablet Take 40 mg by mouth once daily.   Yes Historical Provider   rivaroxaban (XARELTO) 20 mg Tab Take 20 mg by mouth once daily.   Yes Historical Provider   apixaban (ELIQUIS) 5 mg Tab Take 1 tablet (5 mg total) by mouth 2 (two) times daily. 1/8/22 4/8/22  George Nunes MD         PHYSICAL EXAM:   /76   Pulse 73   Temp 98.4 °F (36.9 °C) (Oral)   Resp 20   Ht 5' 10" (1.778 m)   Wt 65.8 kg (145 lb)   SpO2 95%   BMI 20.81 kg/m²   Vitals Reviewed  General appearance: Well-developed, well-nourished male in no apparent " distress.  Skin: No Rash.   Neuro: Motor and sensory exams grossly intact. Good tone. Power in all 4 extremities 5/5.   HENT: Atraumatic head. Moist mucous membranes of oral cavity.  Eyes: Normal extraocular movements.   Neck: Supple. No evidence of lymphadenopathy. No thyroidomegaly.  Lungs: Clear to auscultation bilaterally. No wheezing present.   Heart:  Irregularly irregular rapid rhythm. S1 and S2 present with no murmurs/gallop/rub. No pedal edema. No JVD present.   Abdomen: Soft, non-distended, non-tender. No rebound tenderness/guarding. No masses or organomegaly. Bowel sounds are normal. Bladder is not palpable.   Extremities: No cyanosis, clubbing, or edema.  Psych/mental status: Alert and oriented. Cooperative. Responds appropriately to questions.   EMERGENCY DEPARTMENT LABS AND IMAGING:   Following labs were Reviewed   Recent Labs   Lab 02/01/22  1320   WBC 6.99   HGB 16.4   HCT 47.2      CALCIUM 9.4   ALBUMIN 4.7   PROT 7.9      K 3.6   CO2 26      BUN 12   CREATININE 0.8   ALKPHOS 47*   ALT 23   AST 30   BILITOT 1.5*         BMP:   Recent Labs   Lab 02/01/22  1320         K 3.6      CO2 26   BUN 12   CREATININE 0.8   CALCIUM 9.4   MG 2.0   , CMP   Recent Labs   Lab 02/01/22  1320      K 3.6      CO2 26      BUN 12   CREATININE 0.8   CALCIUM 9.4   PROT 7.9   ALBUMIN 4.7   BILITOT 1.5*   ALKPHOS 47*   AST 30   ALT 23   ANIONGAP 11   ESTGFRAFRICA >60.0   EGFRNONAA >60.0   , CBC   Recent Labs   Lab 02/01/22  1320   WBC 6.99   HGB 16.4   HCT 47.2      , INR   Lab Results   Component Value Date    INR 2.0 02/01/2022    INR 1.1 01/05/2022   , Lipid Panel No results found for: CHOL, HDL, LDLCALC, TRIG, CHOLHDL, Troponin   Recent Labs   Lab 02/01/22  1320 02/01/22  2101   TROPONINI <0.030 <0.030   , A1C: No results for input(s): HGBA1C in the last 4320 hours. and All labs within the past 24 hours have been reviewed  Microbiology Results (last 7 days)      ** No results found for the last 168 hours. **        X-Ray Chest AP Portable   Final Result        CTA Chest Non-Coronary (PE Study)    Result Date: 1/5/2022  CMS MANDATED QUALITY DATA - CT RADIATION - 436 All CT scans at this facility utilize dose modulation, iterative reconstruction, and/or weight based dosing when appropriate to reduce radiation dose to as low as reasonably achievable. Reason: Pulmonary embolism (PE) suspected, positive D-dimer Elevated D-Dimer, SOB, COVID+ TECHNIQUE: CT angiography of thorax with 70 mL Omnipaque 350.  Maximum intensity projection coronal reformations were created at a separate workstation and stored in the patient's permanent medical record. COMPARISON: None CTA THORAX: Negative for pulmonary embolus. Heart is slightly enlarged with coronary artery calcifications. Thoracic aorta is of normal caliber and without dissection. Minimal patchy centrilobular groundglass opacity occurs in right lower lobe (for example series 4 image 151). Lungs are otherwise clear. Trachea and main bronchi are patent. No pleural or pericardial effusion. No enlarged hilar, mediastinal, or axillary lymph nodes. Visualized upper abdomen is unremarkable. Degenerative changes affect the spine. No acute osseous abnormality. IMPRESSION: 1. Negative for pulmonary embolus. 2. Coronary artery calcification and mild cardiomegaly. 3. Minimal patchy centrilobular groundglass opacity in right right lower lobe focally could represent minimal infectious or inflammatory bronchiolitis or alveolitis. Given history, this could reflect minimal parenchymal change related to reported Covid positivity. Electronically signed by:  Umer Sam MD  1/5/2022 6:19 PM CST Workstation: [id number]    X-Ray Chest AP Portable    Result Date: 2/1/2022  Reason: Atrial fibrillation with RVR Afib with rvr FINDINGS: Portable chest at 1334 compared with 1/5/2022 shows normal cardiomediastinal silhouette. Lungs are clear. Pulmonary  vasculature is normal. No acute osseous abnormality. IMPRESSION: Negative chest. Electronically signed by:  Umer Sam MD  2/1/2022 1:42 PM CST Workstation: [id number]    X-Ray Chest AP Portable    Result Date: 1/5/2022  XR CHEST 1 VIEW CLINICAL HISTORY: 66 years Male PALPITATIONS COMPARISON: None FINDINGS: Cardiomediastinal silhouette is within normal limits. Lungs are normally expanded with no airspace consolidation. No pleural effusion or pneumothorax. No acute osseous abnormality. IMPRESSION: No acute pulmonary process. Electronically signed by:  Eufemia Waters MD  1/5/2022 3:47 PM CST Workstation: [id number]    Echo    Result Date: 1/6/2022  · The left ventricle is normal in size with normal systolic function. · The estimated ejection fraction is 60%. · Normal left ventricular diastolic function. · There is abnormal septal wall motion. There is systolic and diastolic flattening of the interventricular septum consistent with right ventricle pressure and volume overload. · Normal right ventricular size with mildly reduced right ventricular systolic function. · Moderate left atrial enlargement. · Mild right atrial enlargement. · Mild tricuspid regurgitation. · Elevated central venous pressure (15 mmHg). · The estimated PA systolic pressure is 40 mmHg.      I personally reviewed and agree with the radiologist's findings    Twelve lead EKG reveals atrial fibrillation with RVR of 152 this is a normal axis this good R-wave progression this ST depression and T-wave inversion in the inferior lateral leads this low voltage throughout  milliseconds  ASSESSMENT & PLAN:   Les Goldman is a 66 y.o. male admitted for    1. Atrial Fib with RVR  -IV diltiazem  -hold Norvasc while on diltiazem drip  -check digoxin level  -continue Xarelto  -cardiology consult    2-essential hypertension  -continue home medications to manage    3.  Recent COVID-19 infection  -currently COVID negative    DVT Prophylaxis: will be  placed on on Xarelto for DVT prophylaxis and will be advised to be as mobile as possible and sit in a chair as tolerated.   ______________________________________________________________  Face-to-Face encounter date: 02/01/2022  Encounter included review of the medical records, interviewing and examining the patient face-to-face, discussion with family and other health care providers including emergency medicine physician, admission orders, interpreting lab/test results and formulating a plan of care.   Medical Decision Making during this encounter was  [_] Low Complexity  [_] Moderate Complexity  [x] High Complexity  _________________________________________________________________________________    INPATIENT LIST OF MEDICATIONS     Current Facility-Administered Medications:     acetaminophen tablet 650 mg, 650 mg, Oral, Q8H PRN, Bethanie Simmons NP    [START ON 2/2/2022] amLODIPine tablet 10 mg, 10 mg, Oral, Daily, Bethanie Simmons NP    [START ON 2/2/2022] digoxin tablet 250 mcg, 250 mcg, Oral, Daily, Bethanie Simmons NP    diltiaZEM 100 mg in dextrose 5% 100 mL IVPB (ready to mix system) (titrating), 0-15 mg/hr, Intravenous, Continuous, Kwadwo Schuler MD, Last Rate: 2.5 mL/hr at 02/01/22 1327, 2.5 mg/hr at 02/01/22 1327    [START ON 2/2/2022] famotidine (PF) injection 20 mg, 20 mg, Intravenous, Daily, Bethanie Simmons NP    melatonin tablet 6 mg, 6 mg, Oral, Nightly PRN, Bethanie Simmons NP    morphine injection 1 mg, 1 mg, Intravenous, Q6H PRN, Bethanie Troy, NP    ondansetron injection 4 mg, 4 mg, Intravenous, Q8H PRN, Bethanie Simmons NP    [START ON 2/2/2022] rivaroxaban tablet 20 mg, 20 mg, Oral, Daily, Bethanie Simmons NP    sodium chloride 0.9% flush 10 mL, 10 mL, Intravenous, PRN, Bethanie Simmons NP    [START ON 2/2/2022] valsartan tablet 40 mg, 40 mg, Oral, Daily, Bethanie Simmons NP    Current Outpatient Medications:     amLODIPine (NORVASC) 10 MG tablet, Take 10 mg by mouth once daily., Disp: , Rfl:      digoxin (LANOXIN) 250 mcg tablet, Take 1 tablet (250 mcg total) by mouth once daily. 1 week, Disp: 30 tablet, Rfl: 2    olmesartan (BENICAR) 40 MG tablet, Take 40 mg by mouth once daily., Disp: , Rfl:     rivaroxaban (XARELTO) 20 mg Tab, Take 20 mg by mouth once daily., Disp: , Rfl:     apixaban (ELIQUIS) 5 mg Tab, Take 1 tablet (5 mg total) by mouth 2 (two) times daily., Disp: 60 tablet, Rfl: 2      Scheduled Meds:   [START ON 2/2/2022] amLODIPine  10 mg Oral Daily    [START ON 2/2/2022] digoxin  250 mcg Oral Daily    [START ON 2/2/2022] famotidine (PF)  20 mg Intravenous Daily    [START ON 2/2/2022] rivaroxaban  20 mg Oral Daily    [START ON 2/2/2022] valsartan  40 mg Oral Daily     Continuous Infusions:   dilTIAZem 2.5 mg/hr (02/01/22 1327)     PRN Meds:.      Bethanie Simmons  [facility] Hospitalist NP  02/01/2022

## 2022-02-02 NOTE — HOSPITAL COURSE
Patient is admitted atrial fibrillation with RVR.  Patient was started Cardizem drip.  RVR improved and subsequently patient was weaned off of Cardizem drip.  Continues to be in AFib however was rate controlled.  Cardiology adjusted patient's oral cardiac regimen.  Patient remained stable during hospitalization.  Patient was stable discharge home.    General: Patient resting comfortably in no acute distress. Appears as stated age. Calm  Eyes: EOM intact. No conjunctivae injection. No scleral icterus.  ENT: Hearing grossly intact. No discharge from ears. No nasal discharge.   CVS: IRR. No LE edema BL.  Lungs: CTA BL, no wheezing or crackles. Good breath sounds. No accessory muscle use. No acute respiratory distress  Neuro: Alert. Cranial nerves grossly intact. Moves all extremities equally. Follows commands. Responds appropriately  Psych: Mood, behavior, thought content and judgement normal     Case discussed with Dr. Franklin prior to discharge

## 2022-02-02 NOTE — CONSULTS
[facility]  Department of Cardiology  Consult Note      PATIENT NAME: Les Goldman    MRN: [id number]  TODAY'S DATE: 02/02/2022  ADMIT DATE: 2/1/2022                          CONSULT REQUESTED BY: Jose Alfredo Ordonez MD    SUBJECTIVE     PRINCIPAL PROBLEM: Atrial fibrillation with RVR      REASON FOR CONSULT:  ATRIAL FIBRILLATION RAPID RESPONSE      HPI:  Patient is 66-year-old male tested positive for COVID January 7th and was admitted with atrial fibrillation rapid response. He has HTN  The patient was anticoagulated with apixaban and given digoxin and his heart rate was controlled and discharged.      He is currently readmitted with atrial fibrillation rapid response.    Review of patient's allergies indicates:  No Known Allergies    Past Medical History:   Diagnosis Date    Atrial fibrillation with RVR 01/2022    COVID-19 01/2022    Hypertension      No past surgical history on file.  Social History     Tobacco Use    Smoking status: Never Smoker   Substance Use Topics    Alcohol use: Not Currently    Drug use: Never        REVIEW OF SYSTEMS  CONSTITUTIONAL: Negative for chills, fatigue and fever.   EYES: No double vision, No blurred vision  NEURO: No headaches, No dizziness  RESPIRATORY: Negative for cough, shortness of breath and wheezing.    CARDIOVASCULAR: Negative for chest pain. Negative for palpitations and leg swelling.   GI: Negative for abdominal pain, No melena, diarrhea, nausea and vomiting.   : Negative for dysuria and frequency, Negative for hematuria  SKIN: Negative for bruising, Negative for edema or discoloration noted.   ENDOCRINE: Negative for polyphagia, Negative for heat intolerance, Negative for cold intolerance  PSYCHIATRIC: Negative for depression, Negative for anxiety, Negative for memory loss  MUSCULOSKELETAL: Negative for neck pain, Negative for muscle weakness, Negative for back pain     OBJECTIVE     VITAL SIGNS (Most Recent)  Temp: 98 °F (36.7 °C) (02/02/22  0946)  Pulse: 75 (02/02/22 0946)  Resp: 15 (02/02/22 0946)  BP: 126/75 (02/02/22 0946)  SpO2: 96 % (02/02/22 0946)    VENTILATION STATUS  Resp: 15 (02/02/22 0946)  SpO2: 96 % (02/02/22 0946)       I & O (Last 24H):    Intake/Output Summary (Last 24 hours) at 2/2/2022 1022  Last data filed at 2/2/2022 0000  Gross per 24 hour   Intake --   Output 1025 ml   Net -1025 ml       WEIGHTS  Wt Readings from Last 3 Encounters:   02/01/22 1315 65.8 kg (145 lb)   01/06/22 0015 66.4 kg (146 lb 6.2 oz)   01/05/22 1458 68 kg (150 lb)   01/06/22 0830 66.2 kg (146 lb)       PHYSICAL EXAM  GENERAL: well built, well nourished, well-developed in no apparent distress alert and oriented.   HEENT: Normocephalic. Pupils normal and conjunctivae normal.  Mucous membranes normal, no cyanosis or icterus, trachea central,no pallor or icterus is noted..   NECK: No JVD. No bruit..   THYROID: Thyroid not enlarged. No nodules present..   CARDIAC: Regular rate and rhythm. S1 is normal.S2 is normal.No gallops, clicks or murmurs noted at this time.  CHEST ANATOMY: normal.   LUNGS: Clear to auscultation. No wheezing or rhonchi..   ABDOMEN: Soft no masses or organomegaly.  No abdomen pulsations or bruits.  Normal bowel sounds. No pulsations and no masses felt, No guarding or rebound.   URINARY: No brown catheter   EXTREMITIES: No cyanosis, clubbing or edema noted at this time., no calf tenderness bilaterally.   PERIPHERAL VASCULAR SYSTEM: Good palpable distal pulses.   CENTRAL NERVOUS SYSTEM: No focal motor or sensory deficits noted.   SKIN: Skin without lesions, moist, well perfused.   MUSCLE STRENGTH & TONE: No noteable weakness, atrophy or abnormal movement.     HOME MEDICATIONS:  No current facility-administered medications on file prior to encounter.     Current Outpatient Medications on File Prior to Encounter   Medication Sig Dispense Refill    amLODIPine (NORVASC) 10 MG tablet Take 10 mg by mouth once daily.      digoxin (LANOXIN) 250 mcg  tablet Take 1 tablet (250 mcg total) by mouth once daily. 1 week 30 tablet 2    olmesartan (BENICAR) 40 MG tablet Take 40 mg by mouth once daily.      rivaroxaban (XARELTO) 20 mg Tab Take 20 mg by mouth once daily.      apixaban (ELIQUIS) 5 mg Tab Take 1 tablet (5 mg total) by mouth 2 (two) times daily. 60 tablet 2       SCHEDULED MEDS:   digoxin  250 mcg Oral Daily    famotidine (PF)  20 mg Intravenous Daily    rivaroxaban  20 mg Oral Daily with dinner    valsartan  40 mg Oral Daily       CONTINUOUS INFUSIONS:   dilTIAZem Stopped (02/02/22 0233)       PRN MEDS:acetaminophen, melatonin, morphine, ondansetron, sodium chloride 0.9%    LABS AND DIAGNOSTICS     CBC LAST 3 DAYS  Recent Labs   Lab 02/01/22  1320 02/02/22  0444   WBC 6.99 5.15   RBC 5.07 4.85   HGB 16.4 15.4   HCT 47.2 43.5   MCV 93 90   MCH 32.3* 31.8*   MCHC 34.7 35.4   RDW 12.1 12.2    167   MPV 9.5 9.3   GRAN 72.8  5.1 55.2  2.8   LYMPH 15.5*  1.1 28.7  1.5   MONO 10.6  0.7 12.8  0.7   BASO 0.03 0.02   NRBC 0 0       COAGULATION LAST 3 DAYS  Recent Labs   Lab 02/01/22  1320   LABPT 21.7*   INR 2.0   APTT 34.9       CHEMISTRY LAST 3 DAYS  Recent Labs   Lab 02/01/22  1320 02/02/22  0444    139   K 3.6 3.7    102   CO2 26 26   ANIONGAP 11 11   BUN 12 14   CREATININE 0.8 0.8    92   CALCIUM 9.4 8.9   MG 2.0  --    ALBUMIN 4.7 4.0   PROT 7.9 6.5   ALKPHOS 47* 43*   ALT 23 20   AST 30 24   BILITOT 1.5* 1.3*       CARDIAC PROFILE LAST 3 DAYS  Recent Labs   Lab 02/01/22  1320 02/01/22  2101 02/02/22  0444   *  --   --    CPK 69  --   --    CPKMB 1.5  --   --    TROPONINI <0.030 <0.030 <0.030       ENDOCRINE LAST 3 DAYS  Recent Labs   Lab 02/01/22  2101   TSH 2.870   PROCAL <0.05       LAST ARTERIAL BLOOD GAS  ABG  No results for input(s): PH, PO2, PCO2, HCO3, BE in the last 168 hours.    LAST 7 DAYS MICROBIOLOGY   Microbiology Results (last 7 days)     ** No results found for the last 168 hours. **          MOST  RECENT IMAGING  X-Ray Chest AP Portable  Reason: Atrial fibrillation with RVR Afib with rvr    FINDINGS:  Portable chest at 1334 compared with 1/5/2022 shows normal cardiomediastinal silhouette.    Lungs are clear. Pulmonary vasculature is normal. No acute osseous abnormality.    IMPRESSION:  Negative chest.    Electronically signed by:  Umer Sam MD  2/1/2022 1:42 PM CST Workstation: [id number]      ECHOCARDIOGRAM RESULTS (last 5)  Results for orders placed during the hospital encounter of 01/05/22    Echo    Interpretation Summary  · The left ventricle is normal in size with normal systolic function.  · The estimated ejection fraction is 60%.  · Normal left ventricular diastolic function.  · There is abnormal septal wall motion. There is systolic and diastolic flattening of the interventricular septum consistent with right ventricle pressure and volume overload.  · Normal right ventricular size with mildly reduced right ventricular systolic function.  · Moderate left atrial enlargement.  · Mild right atrial enlargement.  · Mild tricuspid regurgitation.  · Elevated central venous pressure (15 mmHg).  · The estimated PA systolic pressure is 40 mmHg.      CURRENT/PREVIOUS VISIT EKG  Results for orders placed or performed during the hospital encounter of 02/01/22   EKG 12-lead    Collection Time: 02/02/22  6:08 AM    Narrative    Test Reason : I48.91,    Vent. Rate : 070 BPM     Atrial Rate : 085 BPM     P-R Int : 000 ms          QRS Dur : 084 ms      QT Int : 370 ms       P-R-T Axes : 000 043 019 degrees     QTc Int : 399 ms    Atrial fibrillation  Possible Anterior infarct (cited on or before 05-JAN-2022)  Abnormal ECG  When compared with ECG of 01-FEB-2022 13:17,  Vent. rate has decreased BY  84 BPM  ST no longer depressed in Lateral leads  Nonspecific T wave abnormality now evident in Anterior leads  T wave inversion no longer evident in Lateral leads    Referred By: [email or web address]   SELF           Confirmed By:             ASSESSMENT/PLAN:     Active Hospital Problems    Diagnosis    *Atrial fibrillation with RVR    Primary hypertension    COVID-19       ASSESSMENT & PLAN:   Atrial fibrillation RVR  S/P COVID      RECOMMENDATIONS:  D/D digoxin.   Start Amiodarone 400 po daily   HR CONTROL  He has F/U apt for OP cardioversion  Continnue xarelto.    OK to discharge      Aries Franklin MD  [facility]  Department of Cardiology  Date of Service: 02/02/2022  10:22 AM

## 2022-02-02 NOTE — PLAN OF CARE
02/02/22 1420   Discharge Assessment   Assessment Type Discharge Planning Assessment   Confirmed/corrected address, phone number and insurance Yes   Confirmed Demographics Correct on Facesheet   Source of Information patient   When was your last doctors appointment?   (patient said that he does not have a regular doctor and last seen at urgent care but unknown date.)   Does patient/caregiver understand observation status   (This patient is in Inpatient status.)   Communicated SMITH with patient/caregiver Yes   Lives With alone   Facility Arrived From: home   Do you expect to return to your current living situation? Yes   Prior to hospitilization cognitive status: Alert/Oriented   Current cognitive status: Alert/Oriented   Walking or Climbing Stairs Difficulty none   Dressing/Bathing Difficulty none   Equipment Currently Used at Home none   Readmission within 30 days? Yes   Patient currently being followed by outpatient case management? No   Do you currently have service(s) that help you manage your care at home? No   Do you take prescription medications? Yes   Do you have prescription coverage? Yes   Do you have any problems affording any of your prescribed medications? Yes  (Patient did report that his Xarelto cost $200 dollars per month and that is difficult to afford)   If yes, what medications? Xarelto   Who is going to help you get home at discharge? self   Are you on dialysis? No   Do you take coumadin? No   Discharge Plan A Home   Discharge Plan B Home   DME Needed Upon Discharge  none   Discharge Plan discussed with: Patient   Discharge Barriers Identified None

## 2022-02-02 NOTE — DISCHARGE SUMMARY
[facility] - Emergency Dept  Hospital Medicine  Discharge Summary      Patient Name: Les Goldman  MRN: [id number]  Patient Class: IP- Inpatient  Admission Date: 2/1/2022  Hospital Length of Stay: 1 days  Discharge Date and Time: 2/2/2022  5:40 PM  Attending Physician: Anali att. providers found   Discharging Provider: Jose Alfredo Ordonez MD  Primary Care Provider: Primary Doctor Anali      HPI:   Les Goldman is a 66 y.o. old male who  has a past medical history of Atrial fibrillation with RVR (01/2022), COVID-19 (01/2022), and Hypertension.. The patient presented to [facility] on 2/1/2022 with a primary complaint of Palpitations (Started this morning. Current heart rate is 162 bpm. Denies chest pain)  .   66-year-old male presents to emergency room with complaints of palpitations.  The patient states he was recently admitted to the hospital with COVID-19 infection and new onset atrial fibrillation after receiving a booster injection of the COVID-19 vaccine.  The patient on his last admit was placed on digoxin and Xarelto.  Today while he was at the dermatologist's he noted his rate to be 170 he was having palpitations.  So he came to the emergency room for further evaluation he also had been complaining of fatigue and generalized weakness.  He denied cough shortness of breath or actual chest pain he describes symptoms as moderate in severity with no alleviating or exacerbating factors     Past medical history significant for hypertension COVID-19 infection and atrial fibrillation with RVR      * No surgery found *      Hospital Course:   Patient is admitted atrial fibrillation with RVR.  Patient was started Cardizem drip.  RVR improved and subsequently patient was weaned off of Cardizem drip.  Continues to be in AFib however was rate controlled.  Cardiology adjusted patient's oral cardiac regimen.  Patient remained stable during hospitalization.  Patient was stable discharge  home.    General: Patient resting comfortably in no acute distress. Appears as stated age. Calm  Eyes: EOM intact. No conjunctivae injection. No scleral icterus.  ENT: Hearing grossly intact. No discharge from ears. No nasal discharge.   CVS: IRR. No LE edema BL.  Lungs: CTA BL, no wheezing or crackles. Good breath sounds. No accessory muscle use. No acute respiratory distress  Neuro: Alert. Cranial nerves grossly intact. Moves all extremities equally. Follows commands. Responds appropriately  Psych: Mood, behavior, thought content and judgement normal     Case discussed with Dr. Franklin prior to discharge       Goals of Care Treatment Preferences:  Code Status: Full Code      Consults:   Consults (From admission, onward)        Status Ordering Provider     Inpatient consult to Cardiology  Once        Provider:  Aries Franklin MD    Completed SADIA PEDRO          No new Assessment & Plan notes have been filed under this hospital service since the last note was generated.  Service: Hospital Medicine    Final Active Diagnoses:    Diagnosis Date Noted POA    PRINCIPAL PROBLEM:  Atrial fibrillation [I48.91] 01/05/2022 Yes    Primary hypertension [I10] 01/05/2022 Yes    COVID-19 [U07.1] 01/05/2022 Yes      Problems Resolved During this Admission:       Discharged Condition: stable    Disposition: Home or Self Care    Follow Up:   Follow-up Information     Aries Franklin MD In 1 week.    Specialties: Cardiovascular Disease, Cardiology  Why: Call to schedule appointment, Cardiology follow-up  Contact information:  [address] [facility]  [address]  [facility] [address]  [phone]             [facility] - Emergency Dept.    Specialty: Emergency Medicine  Why: As needed  Contact information:  [address] [facility] [address] [phone]  Additional information:  1st floor                     Patient Instructions:      Diet Cardiac     Notify your health care provider if you experience any of  the following:  temperature >100.4     Notify your health care provider if you experience any of the following:  persistent nausea and vomiting or diarrhea     Notify your health care provider if you experience any of the following:  severe uncontrolled pain     Notify your health care provider if you experience any of the following:  redness, tenderness, or signs of infection (pain, swelling, redness, odor or green/yellow discharge around incision site)     Notify your health care provider if you experience any of the following:  difficulty breathing or increased cough     Notify your health care provider if you experience any of the following:  severe persistent headache     Notify your health care provider if you experience any of the following:  worsening rash     Notify your health care provider if you experience any of the following:  persistent dizziness, light-headedness, or visual disturbances     Notify your health care provider if you experience any of the following:  increased confusion or weakness     Activity as tolerated       Significant Diagnostic Studies: Labs:   CMP   Recent Labs   Lab 02/02/22  0444      K 3.7      CO2 26   GLU 92   BUN 14   CREATININE 0.8   CALCIUM 8.9   PROT 6.5   ALBUMIN 4.0   BILITOT 1.3*   ALKPHOS 43*   AST 24   ALT 20   ANIONGAP 11   ESTGFRAFRICA >60.0   EGFRNONAA >60.0    and CBC   Recent Labs   Lab 02/02/22  0444   WBC 5.15   HGB 15.4   HCT 43.5          Pending Diagnostic Studies:     None         Medications:  Reconciled Home Medications:      Medication List      START taking these medications    amiodarone 200 MG Tab  Commonly known as: PACERONE  Take 2 tablets (400 mg total) by mouth once daily.        CONTINUE taking these medications    amLODIPine 10 MG tablet  Commonly known as: NORVASC  Take 10 mg by mouth once daily.     digoxin 250 mcg tablet  Commonly known as: LANOXIN  Take 1 tablet (250 mcg total) by mouth once daily. 1 week     olmesartan  40 MG tablet  Commonly known as: BENICAR  Take 40 mg by mouth once daily.     XARELTO 20 mg Tab  Generic drug: rivaroxaban  Take 20 mg by mouth once daily.        STOP taking these medications    apixaban 5 mg Tab  Commonly known as: TERE        ASK your doctor about these medications    OPW TEST CLAIM - DO NOT FILL  OPW test claim. Do not fill.            Indwelling Lines/Drains at time of discharge:   Lines/Drains/Airways     None                 Time spent on the discharge of patient: 35 minutes         Jose Alfredo Ordonez MD  Department of Hospital Medicine  [facility] - Emergency Dept

## 2022-02-03 NOTE — HPI
Les Goldman is a 66 y.o. old male who  has a past medical history of Atrial fibrillation with RVR (01/2022), COVID-19 (01/2022), and Hypertension.. [facility] with a primary complaint of Palpitations (Started this morning. Current heart rate is 162 bpm. Denies chest pain)  .   66-year-old male presents to emergency room with complaints of palpitations.  The patient states he was recently admitted to the hospital with COVID-19 infection and new onset atrial fibrillation after receiving a booster injection of the COVID-19 vaccine.  The patient on his last admit was placed on digoxin and Xarelto.  Today while he was at the dermatologist's he noted his rate to be 170 he was having palpitations.  So he came to the emergency room for further evaluation he also had been complaining of fatigue and generalized weakness.  He denied cough shortness of breath or actual chest pain he describes symptoms as moderate in severity with no alleviating or exacerbating factors     Past medical history significant for hypertension COVID-19 infection and atrial fibrillation with RVR

## 2022-02-14 ENCOUNTER — PATIENT MESSAGE (OUTPATIENT)
Dept: RESEARCH | Facility: HOSPITAL | Age: 67
End: 2022-02-14
Payer: MEDICARE

## 2022-02-18 ENCOUNTER — OFFICE VISIT (OUTPATIENT)
Dept: CARDIOLOGY | Facility: CLINIC | Age: 67
End: 2022-02-18
Payer: MEDICARE

## 2022-02-18 ENCOUNTER — TELEPHONE (OUTPATIENT)
Dept: CARDIOLOGY | Facility: CLINIC | Age: 67
End: 2022-02-18

## 2022-02-18 VITALS
HEIGHT: 70 IN | SYSTOLIC BLOOD PRESSURE: 118 MMHG | DIASTOLIC BLOOD PRESSURE: 76 MMHG | HEART RATE: 103 BPM | OXYGEN SATURATION: 96 % | WEIGHT: 153 LBS | RESPIRATION RATE: 16 BRPM | BODY MASS INDEX: 21.9 KG/M2

## 2022-02-18 DIAGNOSIS — I48.0 PAROXYSMAL ATRIAL FIBRILLATION: Primary | ICD-10-CM

## 2022-02-18 DIAGNOSIS — U07.1 COVID-19: ICD-10-CM

## 2022-02-18 DIAGNOSIS — I10 PRIMARY HYPERTENSION: ICD-10-CM

## 2022-02-18 PROCEDURE — 93000 EKG 12-LEAD: ICD-10-PCS | Mod: S$GLB,,, | Performed by: GENERAL PRACTICE

## 2022-02-18 PROCEDURE — 99213 PR OFFICE/OUTPT VISIT, EST, LEVL III, 20-29 MIN: ICD-10-PCS | Mod: CR,S$GLB,, | Performed by: GENERAL PRACTICE

## 2022-02-18 PROCEDURE — 93000 ELECTROCARDIOGRAM COMPLETE: CPT | Mod: S$GLB,,, | Performed by: GENERAL PRACTICE

## 2022-02-18 PROCEDURE — 99213 OFFICE O/P EST LOW 20 MIN: CPT | Mod: CR,S$GLB,, | Performed by: GENERAL PRACTICE

## 2022-02-18 RX ORDER — AMIODARONE HYDROCHLORIDE 200 MG/1
400 TABLET ORAL 2 TIMES DAILY
Qty: 60 TABLET | Refills: 0 | Status: SHIPPED | OUTPATIENT
Start: 2022-02-18 | End: 2022-03-09 | Stop reason: SDUPTHER

## 2022-02-18 RX ORDER — SODIUM CHLORIDE 9 MG/ML
INJECTION, SOLUTION INTRAVENOUS CONTINUOUS
Status: CANCELLED | OUTPATIENT
Start: 2022-02-18

## 2022-02-18 NOTE — PROGRESS NOTES
Subjective:    Patient ID:  Les Goldman is a 66 y.o. male who presents for follow-up of   Chief Complaint   Patient presents with    Atrial Fibrillation       HPI:    2/1/22 ERV  Patient presents with    Palpitations       Started this morning. Current heart rate is 162 bpm. Denies chest pain      Patient with recent diagnosis of COVID-19 infection associated with new onset atrial fibrillation with RVR.  Patient went to his dermatologist appointment and noted his heart rate was 170. Patient continue with some mild fatigue after his COVID infection.  His no fever chills.  No shortness of breath.  No pleurisy hemoptysis.  Patient was not aware he was so tachycardic.   ERV  66-year-old male with history of hypertension.  Patient presents emergency department with complaint of COVID like symptoms since 12/31.  Patient states has had nonproductive cough, headache, diarrhea with associated shortness of breath.  Patient was seen at [facility] urgent care earlier today and had tested positive for COVID.  While at urgent care patient found to have atrial fibrillation rapid ventricular response with rates in the 170s was told to come to the emergency department further evaluation treatment.  Patient denied chest pain, no nausea, vomiting, no other constitutional symptoms.           History is from the nurse and the chart.  Patient was previously healthy  but having some shortness of breath for more than a week.  He is now admitted with atrial fibrillation rapid response.  His rate was controlled with 80s on Cardizem drip but this was continue this morning about 4:00 a.m..  His heart rate is currently starting to elevate again up to the 140s to 160 when he moves around.       HE IS HERE FOR FOLLOW-UP TODAY  Elective cardioversion could not be for performed while he was in the hospital so he was placed on amiodarone.  Heart rates reasonably controlled but does go up in the 120s with walking little exercise..  No other  symptoms.      Review of patient's allergies indicates:  No Known Allergies    Past Medical History:   Diagnosis Date    Atrial fibrillation with RVR 01/2022    COVID-19 01/2022    Hypertension      No past surgical history on file.  Social History     Tobacco Use    Smoking status: Never Smoker    Smokeless tobacco: Never Used   Substance Use Topics    Alcohol use: Not Currently    Drug use: Never     Family History   Problem Relation Age of Onset    Hypertension Mother     Hypertension Father         Review of Systems:   Constitution: Negative for diaphoresis and fever.   HEENT: Negative for nosebleeds.    Cardiovascular: Negative for chest pain       No dyspnea on exertion       No leg swelling        No palpitations  Respiratory: Negative for shortness of breath and wheezing.    Hematologic/Lymphatic: Negative for bleeding problem. Does not bruise/bleed easily.   Skin: Negative for color change and rash.   Musculoskeletal: Negative for falls and myalgias.   Gastrointestinal: Negative for hematemesis and hematochezia.   Genitourinary: Negative for hematuria.   Neurological: Negative for dizziness and light-headedness.   Psychiatric/Behavioral: Negative for altered mental status and memory loss.          Objective:        Vitals:    02/18/22 1231   BP: 118/76   Pulse: 103   Resp: 16       Lab Results   Component Value Date    WBC 5.15 02/02/2022    HGB 15.4 02/02/2022    HCT 43.5 02/02/2022     02/02/2022    ALT 20 02/02/2022    AST 24 02/02/2022     02/02/2022    K 3.7 02/02/2022     02/02/2022    CREATININE 0.8 02/02/2022    BUN 14 02/02/2022    CO2 26 02/02/2022    TSH 2.870 02/01/2022    INR 2.0 02/01/2022        ECHOCARDIOGRAM RESULTS  Results for orders placed during the hospital encounter of 01/05/22    Echo    Interpretation Summary  · The left ventricle is normal in size with normal systolic function.  · The estimated ejection fraction is 60%.  · Normal left ventricular  diastolic function.  · There is abnormal septal wall motion. There is systolic and diastolic flattening of the interventricular septum consistent with right ventricle pressure and volume overload.  · Normal right ventricular size with mildly reduced right ventricular systolic function.  · Moderate left atrial enlargement.  · Mild right atrial enlargement.  · Mild tricuspid regurgitation.  · Elevated central venous pressure (15 mmHg).  · The estimated PA systolic pressure is 40 mmHg.        CURRENT/PREVIOUS VISIT EKG  Results for orders placed or performed during the hospital encounter of 02/01/22   EKG 12-lead    Collection Time: 02/02/22 10:51 AM    Narrative    Test Reason : I48.91,    Vent. Rate : 074 BPM     Atrial Rate : 000 BPM     P-R Int : 000 ms          QRS Dur : 082 ms      QT Int : 382 ms       P-R-T Axes : 000 035 041 degrees     QTc Int : 424 ms    Atrial fibrillation  Septal infarct (cited on or before 05-JAN-2022)  Abnormal ECG  When compared with ECG of 02-FEB-2022 06:08,  No significant change was found  Confirmed by Daniel Rahman MD ([id number]) on 2/7/2022 8:47:57 PM    Referred By: CHAMP   SELF           Confirmed By:Daniel Rahman MD     No valid procedures specified.   No results found for this or any previous visit.      Physical Exam:  CONSTITUTIONAL: No fever, no chills  HEENT: Normocephalic, atraumatic,pupils reactive to light                 NECK:  No JVD no carotid bruit  CVS: S1S2+, RRR, no murmurs,   LUNGS: Clear  ABDOMEN: Soft, NT, BS+  EXTREMITIES: No cyanosis, edema  : No brown catheter  NEURO: AAO X 3  PSY: Normal affect      Medication List with Changes/Refills   Current Medications    AMLODIPINE (NORVASC) 10 MG TABLET    Take 10 mg by mouth once daily.    DIGOXIN (LANOXIN) 250 MCG TABLET    Take 1 tablet (250 mcg total) by mouth once daily. 1 week    OLMESARTAN (BENICAR) 40 MG TABLET    Take 40 mg by mouth once daily.    OPW TEST CLAIM - DO NOT FILL    OPW test claim. Do not  fill.    RIVAROXABAN (XARELTO) 20 MG TAB    Take 20 mg by mouth once daily.   Changed and/or Refilled Medications    Modified Medication Previous Medication    AMIODARONE (PACERONE) 200 MG TAB amiodarone (PACERONE) 200 MG Tab       Take 2 tablets (400 mg total) by mouth 2 (two) times daily.    Take 2 tablets (400 mg total) by mouth once daily.             Assessment:       1. Paroxysmal atrial fibrillation    2. COVID-19    3. Primary hypertension         Plan:     Problem List Items Addressed This Visit        Cardiac/Vascular    Atrial fibrillation - Primary    Relevant Orders    IN OFFICE EKG 12-LEAD (to [place])    Primary hypertension       Other    COVID-19      Cardioversion   Risk option benefit.He wishes to proceed as OP.      No follow-ups on file.    The patients questions were answered, they verbalized understanding, and agreed with the treatment plan.     AGUSTÍN ANGEL MD  [facility]

## 2022-02-18 NOTE — TELEPHONE ENCOUNTER
----- Message from Kaylin Ruiz sent at 2/18/2022  3:19 PM CST -----  Regarding: Clarifcation  Contact: [facility], [place]  Pharmacy want to speak with a nurse regarding clarification on rx, please call back at [phone] (home)     [facility] [address]  [place] [address] [facility]  [address] [place] [address]  Phone: [phone] Fax: [phone]    Case number [id number]

## 2022-02-21 ENCOUNTER — TELEPHONE (OUTPATIENT)
Dept: CARDIOLOGY | Facility: CLINIC | Age: 67
End: 2022-02-21
Payer: MEDICARE

## 2022-02-21 RX ORDER — DIGOXIN 125 MCG
125 TABLET ORAL DAILY
Qty: 30 TABLET | Refills: 2 | Status: SHIPPED | OUTPATIENT
Start: 2022-02-21 | End: 2022-05-24

## 2022-02-21 NOTE — TELEPHONE ENCOUNTER
----- Message from Quita Johnston sent at 2/21/2022  9:03 AM CST -----  Type:  Pharmacy Calling to Clarify an RX    Name of Caller:  [place]  Pharmacy Name:  [facility]  Prescription Name:  amiodarone (PACERONE) 200 MG Tab  What do they need to clarify?:  drug interaction  Best Call Back Number:    Additional Information:  Caller says they left a message on Friday but never heard back. The patient's medication is flagged for a drug interaction with digoxin (LANOXIN) 250 mcg tablet

## 2022-03-04 ENCOUNTER — TELEPHONE (OUTPATIENT)
Dept: CARDIOLOGY | Facility: CLINIC | Age: 67
End: 2022-03-04
Payer: MEDICARE

## 2022-03-04 ENCOUNTER — PATIENT MESSAGE (OUTPATIENT)
Dept: CARDIOLOGY | Facility: CLINIC | Age: 67
End: 2022-03-04
Payer: MEDICARE

## 2022-03-09 RX ORDER — AMIODARONE HYDROCHLORIDE 200 MG/1
400 TABLET ORAL 2 TIMES DAILY
Qty: 60 TABLET | Refills: 3 | Status: SHIPPED | OUTPATIENT
Start: 2022-03-09 | End: 2022-05-24

## 2022-03-09 NOTE — TELEPHONE ENCOUNTER
----- Message from Jose Cooper sent at 3/9/2022  9:07 AM CST -----  Contact: pt  Type: Needs Medical Advice    Who Called:pt  Best Call Back Number:[phone]    Additional Information: Requesting callback regarding seeing if he is still coming in on the 14th has procedure on the 16th from a text he got     Please Advise-Thank you

## 2022-03-14 ENCOUNTER — HOSPITAL ENCOUNTER (OUTPATIENT)
Dept: PREADMISSION TESTING | Facility: HOSPITAL | Age: 67
Discharge: HOME | End: 2022-03-14
Attending: GENERAL PRACTICE
Payer: MEDICARE

## 2022-03-14 ENCOUNTER — HOSPITAL ENCOUNTER (OUTPATIENT)
Dept: RADIOLOGY | Facility: HOSPITAL | Age: 67
Discharge: HOME | End: 2022-03-14
Attending: GENERAL PRACTICE
Payer: MEDICARE

## 2022-03-14 VITALS — WEIGHT: 145 LBS | HEIGHT: 70 IN | BODY MASS INDEX: 20.76 KG/M2

## 2022-03-14 DIAGNOSIS — Z01.818 PREOP TESTING: Primary | ICD-10-CM

## 2022-03-14 DIAGNOSIS — I48.92 ATRIAL FIBRILLATION AND FLUTTER: ICD-10-CM

## 2022-03-14 DIAGNOSIS — I48.0 PAROXYSMAL ATRIAL FIBRILLATION: ICD-10-CM

## 2022-03-14 DIAGNOSIS — I48.91 ATRIAL FIBRILLATION AND FLUTTER: ICD-10-CM

## 2022-03-14 LAB
ANION GAP SERPL CALC-SCNC: 7 MMOL/L (ref 8–16)
BASOPHILS # BLD AUTO: 0.03 K/UL (ref 0–0.2)
BASOPHILS NFR BLD: 0.5 % (ref 0–1.9)
BUN SERPL-MCNC: 16 MG/DL (ref 8–23)
CALCIUM SERPL-MCNC: 8.7 MG/DL (ref 8.7–10.5)
CHLORIDE SERPL-SCNC: 99 MMOL/L (ref 95–110)
CO2 SERPL-SCNC: 29 MMOL/L (ref 23–29)
CREAT SERPL-MCNC: 1.1 MG/DL (ref 0.5–1.4)
DIFFERENTIAL METHOD: ABNORMAL
EOSINOPHIL # BLD AUTO: 0.1 K/UL (ref 0–0.5)
EOSINOPHIL NFR BLD: 1.2 % (ref 0–8)
ERYTHROCYTE [DISTWIDTH] IN BLOOD BY AUTOMATED COUNT: 13 % (ref 11.5–14.5)
EST. GFR  (AFRICAN AMERICAN): >60 ML/MIN/1.73 M^2
EST. GFR  (NON AFRICAN AMERICAN): >60 ML/MIN/1.73 M^2
GLUCOSE SERPL-MCNC: 131 MG/DL (ref 70–110)
HCT VFR BLD AUTO: 44.1 % (ref 40–54)
HGB BLD-MCNC: 14.9 G/DL (ref 14–18)
IMM GRANULOCYTES # BLD AUTO: 0.01 K/UL (ref 0–0.04)
IMM GRANULOCYTES NFR BLD AUTO: 0.2 % (ref 0–0.5)
LYMPHOCYTES # BLD AUTO: 1.1 K/UL (ref 1–4.8)
LYMPHOCYTES NFR BLD: 17.1 % (ref 18–48)
MAGNESIUM SERPL-MCNC: 2.1 MG/DL (ref 1.6–2.6)
MCH RBC QN AUTO: 32.3 PG (ref 27–31)
MCHC RBC AUTO-ENTMCNC: 33.8 G/DL (ref 32–36)
MCV RBC AUTO: 96 FL (ref 82–98)
MONOCYTES # BLD AUTO: 0.7 K/UL (ref 0.3–1)
MONOCYTES NFR BLD: 11.5 % (ref 4–15)
NEUTROPHILS # BLD AUTO: 4.5 K/UL (ref 1.8–7.7)
NEUTROPHILS NFR BLD: 69.5 % (ref 38–73)
NRBC BLD-RTO: 0 /100 WBC
PLATELET # BLD AUTO: 201 K/UL (ref 150–450)
PMV BLD AUTO: 9.2 FL (ref 9.2–12.9)
POTASSIUM SERPL-SCNC: 3.7 MMOL/L (ref 3.5–5.1)
RBC # BLD AUTO: 4.62 M/UL (ref 4.6–6.2)
SODIUM SERPL-SCNC: 135 MMOL/L (ref 136–145)
WBC # BLD AUTO: 6.44 K/UL (ref 3.9–12.7)

## 2022-03-14 PROCEDURE — 71045 X-RAY EXAM CHEST 1 VIEW: CPT | Mod: TC

## 2022-03-14 PROCEDURE — 80048 BASIC METABOLIC PNL TOTAL CA: CPT | Performed by: GENERAL PRACTICE

## 2022-03-14 PROCEDURE — 36415 COLL VENOUS BLD VENIPUNCTURE: CPT | Performed by: GENERAL PRACTICE

## 2022-03-14 PROCEDURE — 83735 ASSAY OF MAGNESIUM: CPT | Performed by: GENERAL PRACTICE

## 2022-03-14 PROCEDURE — 85025 COMPLETE CBC W/AUTO DIFF WBC: CPT | Performed by: GENERAL PRACTICE

## 2022-03-14 NOTE — DISCHARGE INSTRUCTIONS
Nothing to eat or drink after midnight the night before your procedure.  Do not take any medications the morning of your procedure  Bring all your medications with you in the original pill bottles from pharmacy.  Make arrangements for someone you know to drive you home after your procedure. Taxi and Uber are not acceptable.

## 2022-03-16 ENCOUNTER — HOSPITAL ENCOUNTER (OUTPATIENT)
Facility: HOSPITAL | Age: 67
Discharge: HOME | End: 2022-03-16
Attending: GENERAL PRACTICE | Admitting: GENERAL PRACTICE
Payer: MEDICARE

## 2022-03-16 ENCOUNTER — ANESTHESIA (OUTPATIENT)
Dept: CARDIOLOGY | Facility: HOSPITAL | Age: 67
End: 2022-03-16
Payer: MEDICARE

## 2022-03-16 ENCOUNTER — ANESTHESIA EVENT (OUTPATIENT)
Dept: CARDIOLOGY | Facility: HOSPITAL | Age: 67
End: 2022-03-16
Payer: MEDICARE

## 2022-03-16 VITALS
DIASTOLIC BLOOD PRESSURE: 68 MMHG | RESPIRATION RATE: 20 BRPM | HEART RATE: 55 BPM | OXYGEN SATURATION: 98 % | SYSTOLIC BLOOD PRESSURE: 106 MMHG

## 2022-03-16 DIAGNOSIS — Z01.89 ENCOUNTER FOR CARDIOVERSION PROCEDURE: ICD-10-CM

## 2022-03-16 DIAGNOSIS — I48.91 A-FIB: ICD-10-CM

## 2022-03-16 DIAGNOSIS — I48.0 PAROXYSMAL ATRIAL FIBRILLATION: ICD-10-CM

## 2022-03-16 PROCEDURE — 92960 CARDIOVERSION ELECTRIC EXT: CPT | Mod: ,,, | Performed by: GENERAL PRACTICE

## 2022-03-16 PROCEDURE — 63600175 PHARM REV CODE 636 W HCPCS: Performed by: NURSE ANESTHETIST, CERTIFIED REGISTERED

## 2022-03-16 PROCEDURE — 25000003 PHARM REV CODE 250: Performed by: NURSE ANESTHETIST, CERTIFIED REGISTERED

## 2022-03-16 PROCEDURE — 93010 EKG 12-LEAD: ICD-10-PCS | Mod: 59,76,, | Performed by: GENERAL PRACTICE

## 2022-03-16 PROCEDURE — 93005 ELECTROCARDIOGRAM TRACING: CPT | Mod: 59 | Performed by: GENERAL PRACTICE

## 2022-03-16 PROCEDURE — 92960 CARDIOVERSION ELECTRIC EXT: CPT | Performed by: GENERAL PRACTICE

## 2022-03-16 PROCEDURE — 93010 ELECTROCARDIOGRAM REPORT: CPT | Mod: 59,,, | Performed by: GENERAL PRACTICE

## 2022-03-16 PROCEDURE — 37000008 HC ANESTHESIA 1ST 15 MINUTES: Performed by: GENERAL PRACTICE

## 2022-03-16 PROCEDURE — 92960 PR CARDIOVERSION, ELECTIVE;EXTERN: ICD-10-PCS | Mod: ,,, | Performed by: GENERAL PRACTICE

## 2022-03-16 RX ORDER — SODIUM CHLORIDE 9 MG/ML
INJECTION, SOLUTION INTRAVENOUS CONTINUOUS
Status: DISCONTINUED | OUTPATIENT
Start: 2022-03-16 | End: 2022-03-16 | Stop reason: HOSPADM

## 2022-03-16 RX ORDER — PROPOFOL 10 MG/ML
VIAL (ML) INTRAVENOUS
Status: DISCONTINUED | OUTPATIENT
Start: 2022-03-16 | End: 2022-03-16

## 2022-03-16 RX ADMIN — SODIUM CHLORIDE: 0.9 INJECTION, SOLUTION INTRAVENOUS at 10:03

## 2022-03-16 RX ADMIN — PROPOFOL 50 MG: 10 INJECTION, EMULSION INTRAVENOUS at 10:03

## 2022-03-16 NOTE — ANESTHESIA POSTPROCEDURE EVALUATION
Anesthesia Post Evaluation    Patient: Les Goldman    Procedure(s) Performed: Procedure(s) (LRB):  Cardioversion/Defibrillation (N/A)    Final Anesthesia Type: MAC      Patient location during evaluation: [facility]  Patient participation: Yes- Able to Participate  Level of consciousness: awake and alert  Post-procedure vital signs: reviewed and stable  Pain management: adequate  Airway patency: patent    PONV status at discharge: No PONV  Anesthetic complications: no      Cardiovascular status: hemodynamically stable  Respiratory status: unassisted, spontaneous ventilation and room air  Hydration status: euvolemic  Follow-up not needed.          Vitals Value Taken Time   BP  03/16/22 1034   Temp  03/16/22 1034   Pulse 55 03/16/22 1032   Resp 28 03/16/22 1032   SpO2 100 % 03/16/22 1032   Vitals shown include unvalidated device data.      No case tracking events are documented in the log.      Pain/Vaishali Score: Vaishali Score: 10 (3/16/2022 10:12 AM)

## 2022-03-16 NOTE — PROCEDURES
INFORMED CONSENT WAS OBTAINED    TIME-OUT WAS CALLED    ANESTHESIA WAS GIVEN BY ANESTHETIST      TWO HUNDRED JOULES SYNCHRONIZED CARDIOVERSION WAS APPLIED  HE RETURNED TO SINUS RHYTHM NO COMPLICATIONS    HE WAS NEUROLOGICALLY ALERT    IMPRESSION SUCCESSFUL CARDIOVERSION ATRIAL FIBRILLATION TO SINUS RHYTHM.

## 2022-03-16 NOTE — TRANSFER OF CARE
Anesthesia Transfer of Care Note    Patient: Les Goldman    Procedure(s) Performed: Procedure(s) (LRB):  Cardioversion/Defibrillation (N/A)    Patient location: Other: cardiac center    Anesthesia Type: MAC    Transport from OR: Transported from OR on 2-3 L/min O2 by NC with adequate spontaneous ventilation    Post pain: adequate analgesia    Post assessment: no apparent anesthetic complications    Post vital signs: stable    Level of consciousness: awake    Nausea/Vomiting: no nausea/vomiting    Complications: none    Transfer of care protocol was followed      Last vitals:   Visit Vitals  /73   Pulse 78   Resp 20   SpO2 99%

## 2022-03-16 NOTE — DISCHARGE INSTRUCTIONS
Cardioversion Discharge Instructions:  Ask your physician when you can return to your normal activities  Do not drive for at least 24 hours    SEEK CARE IMMEDIATELY IF:  You feel your heart beating fast or fluttering  You feel weak or faint  Your leg or arm is larger than usual, painful, or warm    CONTACT YOUR PHYSICIAN IF:  Your skin is itchy, swollen, or if you have a rash   You have questions or concerns about your condition or care

## 2022-03-16 NOTE — ANESTHESIA PREPROCEDURE EVALUATION
03/16/2022  Les Goldman is a 66 y.o., male.        Patient Active Problem List   Diagnosis    Atrial fibrillation    COVID-19    Primary hypertension       Past Surgical History:   Procedure Laterality Date    SKIN CANCER EXCISION          Tobacco Use:  The patient  reports that he has never smoked. He has never used smokeless tobacco.     Results for orders placed or performed in visit on 02/18/22   IN OFFICE EKG 12-LEAD (to [place])    Collection Time: 02/18/22 12:36 PM    Narrative    Test Reason : I48.0,    Vent. Rate : 073 BPM     Atrial Rate : 227 BPM     P-R Int : 000 ms          QRS Dur : 088 ms      QT Int : 378 ms       P-R-T Axes : 000 087 054 degrees     QTc Int : 416 ms    Atrial fibrillation  Septal infarct (cited on or before 05-JAN-2022)  Abnormal ECG  When compared with ECG of 02-FEB-2022 10:51,  Serial changes of Septal infarct Present  Confirmed by Rigoberto BLOOM, Aries ELLIS ([id number]) on 2/27/2022 3:21:23 PM    Referred By: [email or web address]   SELF           Confirmed By:Aries Franklin MD             Lab Results   Component Value Date    WBC 6.44 03/14/2022    HGB 14.9 03/14/2022    HCT 44.1 03/14/2022    MCV 96 03/14/2022     03/14/2022     BMP  Lab Results   Component Value Date     (L) 03/14/2022    K 3.7 03/14/2022    CL 99 03/14/2022    CO2 29 03/14/2022    BUN 16 03/14/2022    CREATININE 1.1 03/14/2022    CALCIUM 8.7 03/14/2022    ANIONGAP 7 (L) 03/14/2022     (H) 03/14/2022    GLU 92 02/02/2022     02/01/2022       Results for orders placed during the hospital encounter of 01/05/22    Echo    Interpretation Summary  · The left ventricle is normal in size with normal systolic function.  · The estimated ejection fraction is 60%.  · Normal left ventricular diastolic function.  · There is abnormal septal wall motion. There is systolic and diastolic flattening of the  interventricular septum consistent with right ventricle pressure and volume overload.  · Normal right ventricular size with mildly reduced right ventricular systolic function.  · Moderate left atrial enlargement.  · Mild right atrial enlargement.  · Mild tricuspid regurgitation.  · Elevated central venous pressure (15 mmHg).  · The estimated PA systolic pressure is 40 mmHg.          Pre-op Assessment    I have reviewed the Patient Summary Reports.     I have reviewed the Nursing Notes. I have reviewed the NPO Status.   I have reviewed the Medications.     Review of Systems  Anesthesia Hx:  No problems with previous Anesthesia Denies Hx of Anesthetic complications  Denies Family Hx of Anesthesia complications.   Denies Personal Hx of Anesthesia complications.   Social:  No Alcohol Use, Non-Smoker    Hematology/Oncology:  Hematology Normal       -- Cancer in past history:  surgery  Oncology Comments: SKIN CANCER EXCISION     EENT/Dental:  EENT/Dental Normal Eyes: Visual Impairment Has Bilateral and S/P Extraction - Bilateral Catarract    Cardiovascular:   Hypertension, well controlled Dysrhythmias atrial fibrillation ECG has been reviewed.    Pulmonary:  Pulmonary Normal    Renal/:  Renal/ Normal     Hepatic/GI:  Hepatic/GI Normal Patient with no active nausea vomiting at this time  Patient with no intestinal obstruction at this time    Musculoskeletal:  Musculoskeletal Normal    Neurological:  Neurology Normal    Endocrine:  Endocrine Normal    Dermatological:  Skin Normal    Psych:  Psychiatric Normal           Physical Exam  General: Well nourished, Cooperative, Alert and Oriented    Airway:  Mallampati: I   Mouth Opening: Normal  TM Distance: Normal  Tongue: Normal  Neck ROM: Normal ROM    Dental:  Caps / Implants    Chest/Lungs:  Clear to auscultation, Normal Respiratory Rate    Heart:  Rate: Normal  Rhythm: Irregularly Irregular        Anesthesia Plan  Type of Anesthesia, risks & benefits  discussed:    Anesthesia Type: MAC  Intra-op Monitoring Plan: Standard ASA Monitors  Informed Consent: Informed consent signed with the Patient and all parties understand the risks and agree with anesthesia plan.  All questions answered.   ASA Score: 3  Anesthesia Plan Notes: MAC with Propofol  POM Mask    Ready For Surgery From Anesthesia Perspective.     .

## 2022-03-16 NOTE — H&P
[facility]  Department of Cardiology  Consult Note      PATIENT NAME: Les Goldman    MRN: [id number]  TODAY'S DATE: 03/16/2022  ADMIT DATE: 3/16/2022                          CONSULT REQUESTED BY: Aries Franklin MD    SUBJECTIVE     PRINCIPAL PROBLEM: <principal problem not specified>      REASON FOR CONSULT:        HPI:  2/1/22 ERV       Patient presents with    Palpitations       Started this morning. Current heart rate is 162 bpm. Denies chest pain      Patient with recent diagnosis of COVID-19 infection associated with new onset atrial fibrillation with RVR.  Patient went to his dermatologist appointment and noted his heart rate was 170. Patient continue with some mild fatigue after his COVID infection.  His no fever chills.  No shortness of breath.  No pleurisy hemoptysis.  Patient was not aware he was so tachycardic.   ERV  66-year-old male with history of hypertension.  Patient presents emergency department with complaint of COVID like symptoms since 12/31.  Patient states has had nonproductive cough, headache, diarrhea with associated shortness of breath.  Patient was seen at [facility] urgent care earlier today and had tested positive for COVID.  While at urgent care patient found to have atrial fibrillation rapid ventricular response with rates in the 170s was told to come to the emergency department further evaluation treatment.  Patient denied chest pain, no nausea, vomiting, no other constitutional symptoms.           History is from the nurse and the chart.  Patient was previously healthy  but having some shortness of breath for more than a week.  He is now admitted with atrial fibrillation rapid response.  His rate was controlled with 80s on Cardizem drip but this was continue this morning about 4:00 a.m..  His heart rate is currently starting to elevate again up to the 140s to 160 when he moves around.        HE IS HERE FOR FOLLOW-UP TODAY  Elective cardioversion could not be for  performed while he was in the hospital so he was placed on amiodarone.  Heart rates reasonably controlled but does go up in the 120s with walking little exercise..  No other symptoms.                  Review of patient's allergies indicates:  No Known Allergies    Past Medical History:   Diagnosis Date    Atrial fibrillation with RVR 01/2022    COVID-19 01/2022    Hypertension      Past Surgical History:   Procedure Laterality Date    SKIN CANCER EXCISION       Social History     Tobacco Use    Smoking status: Never Smoker    Smokeless tobacco: Never Used   Substance Use Topics    Alcohol use: Not Currently     Alcohol/week: 24.0 standard drinks     Types: 24 Cans of beer per week    Drug use: Never        REVIEW OF SYSTEMS  CONSTITUTIONAL: Negative for chills, fatigue and fever.   EYES: No double vision, No blurred vision  NEURO: No headaches, No dizziness  RESPIRATORY: Negative for cough, shortness of breath and wheezing.    CARDIOVASCULAR: Negative for chest pain. Negative for palpitations and leg swelling.   GI: Negative for abdominal pain, No melena, diarrhea, nausea and vomiting.   : Negative for dysuria and frequency, Negative for hematuria  SKIN: Negative for bruising, Negative for edema or discoloration noted.   ENDOCRINE: Negative for polyphagia, Negative for heat intolerance, Negative for cold intolerance  PSYCHIATRIC: Negative for depression, Negative for anxiety, Negative for memory loss  MUSCULOSKELETAL: Negative for neck pain, Negative for muscle weakness, Negative for back pain     OBJECTIVE     VITAL SIGNS (Most Recent)  Pulse: 78 (03/16/22 0645)  Resp: 20 (03/16/22 0645)  BP: 114/73 (03/16/22 0645)  SpO2: 99 % (03/16/22 0645)    VENTILATION STATUS  Resp: 20 (03/16/22 0645)  SpO2: 99 % (03/16/22 0645)       I & O (Last 24H):No intake or output data in the 24 hours ending 03/16/22 0851    WEIGHTS  Wt Readings from Last 3 Encounters:   03/14/22 1417 65.8 kg (145 lb)   02/18/22 1231 69.4 kg  (153 lb)   02/01/22 1315 65.8 kg (145 lb)       PHYSICAL EXAM  GENERAL: well built, well nourished, well-developed in no apparent distress alert and oriented.   HEENT: Normocephalic. Pupils normal and conjunctivae normal.  Mucous membranes normal, no cyanosis or icterus, trachea central,no pallor or icterus is noted..   NECK: No JVD. No bruit..   THYROID: Thyroid not enlarged. No nodules present..   CARDIAC: Regular rate and rhythm. S1 is normal.S2 is normal.No gallops, clicks or murmurs noted at this time.  CHEST ANATOMY: normal.   LUNGS: Clear to auscultation. No wheezing or rhonchi..   ABDOMEN: Soft no masses or organomegaly.  No abdomen pulsations or bruits.  Normal bowel sounds. No pulsations and no masses felt, No guarding or rebound.   URINARY: No brown catheter   EXTREMITIES: No cyanosis, clubbing or edema noted at this time., no calf tenderness bilaterally.   PERIPHERAL VASCULAR SYSTEM: Good palpable distal pulses.   CENTRAL NERVOUS SYSTEM: No focal motor or sensory deficits noted.   SKIN: Skin without lesions, moist, well perfused.   MUSCLE STRENGTH & TONE: No noteable weakness, atrophy or abnormal movement.     HOME MEDICATIONS:  No current facility-administered medications on file prior to encounter.     Current Outpatient Medications on File Prior to Encounter   Medication Sig Dispense Refill    amLODIPine (NORVASC) 10 MG tablet Take 10 mg by mouth once daily.      olmesartan (BENICAR) 40 MG tablet Take 40 mg by mouth once daily.      rivaroxaban (XARELTO) 20 mg Tab Take 20 mg by mouth once daily.      OPW TEST CLAIM - DO NOT FILL OPW test claim. Do not fill. 1 each 0       SCHEDULED MEDS:    CONTINUOUS INFUSIONS:   sodium chloride 0.9%         PRN MEDS:    LABS AND DIAGNOSTICS     CBC LAST 3 DAYS  Recent Labs   Lab 03/14/22  1426   WBC 6.44   RBC 4.62   HGB 14.9   HCT 44.1   MCV 96   MCH 32.3*   MCHC 33.8   RDW 13.0      MPV 9.2   GRAN 69.5  4.5   LYMPH 17.1*  1.1   MONO 11.5  0.7   BASO  0.03   NRBC 0       COAGULATION LAST 3 DAYS  No results for input(s): LABPT, INR, APTT in the last 168 hours.    CHEMISTRY LAST 3 DAYS  Recent Labs   Lab 03/14/22  1426   *   K 3.7   CL 99   CO2 29   ANIONGAP 7*   BUN 16   CREATININE 1.1   *   CALCIUM 8.7   MG 2.1       CARDIAC PROFILE LAST 3 DAYS  No results for input(s): BNP, CPK, CPKMB, LDH, TROPONINI in the last 168 hours.    ENDOCRINE LAST 3 DAYS  No results for input(s): TSH, PROCAL in the last 168 hours.    LAST ARTERIAL BLOOD GAS  ABG  No results for input(s): PH, PO2, PCO2, HCO3, BE in the last 168 hours.    LAST 7 DAYS MICROBIOLOGY   Microbiology Results (last 7 days)     ** No results found for the last 168 hours. **          MOST RECENT IMAGING  X-Ray Chest AP Single View  XR CHEST 1 VIEW    CLINICAL HISTORY:  66 years Male arrhythmia    COMPARISON: 2/1/2022    FINDINGS: Cardiomediastinal silhouette is within normal limits. Lungs are normally expanded with no airspace consolidation. No pleural effusion or pneumothorax. No acute osseous abnormality.    IMPRESSION: No acute pulmonary process.    Electronically signed by:  Eufemia Waters MD  3/14/2022 3:05 PM CDT Workstation: [id number]      ECHOCARDIOGRAM RESULTS (last 5)  Results for orders placed during the hospital encounter of 01/05/22    Echo    Interpretation Summary  · The left ventricle is normal in size with normal systolic function.  · The estimated ejection fraction is 60%.  · Normal left ventricular diastolic function.  · There is abnormal septal wall motion. There is systolic and diastolic flattening of the interventricular septum consistent with right ventricle pressure and volume overload.  · Normal right ventricular size with mildly reduced right ventricular systolic function.  · Moderate left atrial enlargement.  · Mild right atrial enlargement.  · Mild tricuspid regurgitation.  · Elevated central venous pressure (15 mmHg).  · The estimated PA systolic pressure is 40  mmHg.      CURRENT/PREVIOUS VISIT EKG  Results for orders placed or performed during the hospital encounter of 03/16/22   EKG 12-lead    Collection Time: 03/16/22  5:41 AM    Narrative    Test Reason : I48.91,    Vent. Rate : 079 BPM     Atrial Rate : 058 BPM     P-R Int : 000 ms          QRS Dur : 092 ms      QT Int : 402 ms       P-R-T Axes : 000 055 045 degrees     QTc Int : 460 ms    Atrial fibrillation  Nonspecific ST abnormality  Abnormal ECG  When compared with ECG of 18-FEB-2022 12:36,  Nonspecific T wave abnormality, improved in Inferior leads  Nonspecific T wave abnormality no longer evident in Lateral leads    Referred By:             Confirmed By:            ASSESSMENT/PLAN:     There are no active hospital problems to display for this patient.      ASSESSMENT & PLAN:   PATIENT IS HERE FOR ELECTIVE CARDIOVERSION    RECOMMENDATIONS:  Risk benefit options explained to him      DISCHARGE NOTE  Cardioversion was performed without event.  Patient converted to single shock to sinus rhythm.  Was well tolerated there were no complications.  Discharged home in satisfactory condition.  Continue same medications.  Follow-up in the office 2 weeks for EKG.          Aries Franklin MD  [facility]  Department of Cardiology  Date of Service: 03/16/2022  8:51 AM

## 2022-03-23 ENCOUNTER — OFFICE VISIT (OUTPATIENT)
Dept: URGENT CARE | Facility: CLINIC | Age: 67
End: 2022-03-23
Payer: MEDICARE

## 2022-03-23 VITALS
RESPIRATION RATE: 16 BRPM | DIASTOLIC BLOOD PRESSURE: 82 MMHG | OXYGEN SATURATION: 97 % | SYSTOLIC BLOOD PRESSURE: 122 MMHG | HEART RATE: 66 BPM | TEMPERATURE: 99 F

## 2022-03-23 DIAGNOSIS — M54.12 CERVICAL RADICULOPATHY: ICD-10-CM

## 2022-03-23 DIAGNOSIS — M70.42 PREPATELLAR BURSITIS OF LEFT KNEE: Primary | ICD-10-CM

## 2022-03-23 PROCEDURE — 99214 PR OFFICE/OUTPT VISIT, EST, LEVL IV, 30-39 MIN: ICD-10-PCS | Mod: S$GLB,,, | Performed by: INTERNAL MEDICINE

## 2022-03-23 PROCEDURE — 99214 OFFICE O/P EST MOD 30 MIN: CPT | Mod: S$GLB,,, | Performed by: INTERNAL MEDICINE

## 2022-03-23 RX ORDER — CYCLOBENZAPRINE HCL 5 MG
TABLET ORAL
Qty: 15 TABLET | Refills: 0 | Status: SHIPPED | OUTPATIENT
Start: 2022-03-23 | End: 2023-08-17

## 2022-03-23 RX ORDER — METHYLPREDNISOLONE 4 MG/1
TABLET ORAL
Qty: 1 EACH | Refills: 0 | Status: SHIPPED | OUTPATIENT
Start: 2022-03-23 | End: 2022-05-24

## 2022-03-23 RX ORDER — CYCLOBENZAPRINE HCL 5 MG
5 TABLET ORAL 3 TIMES DAILY PRN
Qty: 21 TABLET | Refills: 0 | Status: SHIPPED | OUTPATIENT
Start: 2022-03-23 | End: 2022-03-23

## 2022-03-23 NOTE — PROGRESS NOTES
Subjective:       Patient ID: Les Goldman is a 66 y.o. male.    Vitals:  temperature is 98.7 °F (37.1 °C). His blood pressure is 122/82 and his pulse is 66. His respiration is 16 and oxygen saturation is 97%.     Chief Complaint: Knee Injury    Pt is complaining of right knee injury that occurred last night.    ROS    Objective:      Physical Exam   Constitutional: normal  HENT:   Head: Normocephalic and atraumatic.   Neck:      Comments: Pain L post neck and upper shoulder with ROM and palpation with increased muscle tone   Abdominal: Normal appearance.   Neurological: He is alert.   Nursing note and vitals reviewed.        Assessment:       1. Prepatellar bursitis of left knee    2. Cervical radiculopathy          Plan:         Prepatellar bursitis of left knee  -     methylPREDNISolone (MEDROL DOSEPACK) 4 mg tablet; use as directed  Dispense: 1 each; Refill: 0    Cervical radiculopathy  -     methylPREDNISolone (MEDROL DOSEPACK) 4 mg tablet; use as directed  Dispense: 1 each; Refill: 0  -     Discontinue: cyclobenzaprine (FLEXERIL) 5 MG tablet; Take 1 tablet (5 mg total) by mouth 3 (three) times daily as needed for Muscle spasms.  Dispense: 21 tablet; Refill: 0  -     cyclobenzaprine (FLEXERIL) 5 MG tablet; 1 po hs for muscle spasms  Dispense: 15 tablet; Refill: 0

## 2022-04-11 NOTE — TELEPHONE ENCOUNTER
----- Message from Danii Ferro sent at 4/11/2022  2:07 PM CDT -----  Type: Needs Medical Advice  Who Called:  Pt  Pharmacy name and phone #:    [facility] [id number]  [place], [place] - [address] [employer]  [address]  [facility] [address]  Phone: [phone] Fax: [phone]  Best Call Back Number: [phone]  Additional Information: Pt requesting return call to discuss the discontinue taking blood thinner--please advise--Thank you

## 2022-05-24 ENCOUNTER — OFFICE VISIT (OUTPATIENT)
Dept: CARDIOLOGY | Facility: CLINIC | Age: 67
End: 2022-05-24
Payer: MEDICARE

## 2022-05-24 VITALS
WEIGHT: 14.63 LBS | SYSTOLIC BLOOD PRESSURE: 118 MMHG | DIASTOLIC BLOOD PRESSURE: 84 MMHG | OXYGEN SATURATION: 97 % | BODY MASS INDEX: 2.09 KG/M2 | HEART RATE: 63 BPM

## 2022-05-24 DIAGNOSIS — U07.1 COVID-19: ICD-10-CM

## 2022-05-24 DIAGNOSIS — Z01.89 ENCOUNTER FOR CARDIOVERSION PROCEDURE: Primary | ICD-10-CM

## 2022-05-24 DIAGNOSIS — I48.0 PAROXYSMAL ATRIAL FIBRILLATION: ICD-10-CM

## 2022-05-24 DIAGNOSIS — I10 PRIMARY HYPERTENSION: ICD-10-CM

## 2022-05-24 PROCEDURE — 93000 ELECTROCARDIOGRAM COMPLETE: CPT | Mod: CR,S$GLB,, | Performed by: GENERAL PRACTICE

## 2022-05-24 PROCEDURE — 99213 OFFICE O/P EST LOW 20 MIN: CPT | Mod: CR,S$GLB,, | Performed by: GENERAL PRACTICE

## 2022-05-24 PROCEDURE — 93000 EKG 12-LEAD: ICD-10-PCS | Mod: CR,S$GLB,, | Performed by: GENERAL PRACTICE

## 2022-05-24 PROCEDURE — 99213 PR OFFICE/OUTPT VISIT, EST, LEVL III, 20-29 MIN: ICD-10-PCS | Mod: CR,S$GLB,, | Performed by: GENERAL PRACTICE

## 2022-05-24 NOTE — PROGRESS NOTES
Subjective:    Patient ID:  Les Goldman is a 66 y.o. male who presents for follow-up of   Chief Complaint   Patient presents with    Hospital Follow Up       HPI:     Palpitations       Started this morning. Current heart rate is 162 bpm. Denies chest pain      Patient with recent diagnosis of COVID-19 infection associated with new onset atrial fibrillation with RVR.  Patient went to his dermatologist appointment and noted his heart rate was 170. Patient continue with some mild fatigue after his COVID infection.  His no fever chills.  No shortness of breath.  No pleurisy hemoptysis.  Patient was not aware he was so tachycardic.   ERV  66-year-old male with history of hypertension.  Patient presents emergency department with complaint of COVID like symptoms since 12/31.  Patient states has had nonproductive cough, headache, diarrhea with associated shortness of breath.  Patient was seen at [facility] urgent care earlier today and had tested positive for COVID.  While at urgent care patient found to have atrial fibrillation rapid ventricular response with rates in the 170s was told to come to the emergency department further evaluation treatment.  Patient denied chest pain, no nausea, vomiting, no other constitutional symptoms.           History is from the nurse and the chart.  Patient was previously healthy  but having some shortness of breath for more than a week.  He is now admitted with atrial fibrillation rapid response.  His rate was controlled with 80s on Cardizem drip but this was continue this morning about 4:00 a.m..  His heart rate is currently starting to elevate again up to the 140s to 160 when he moves around.        HE IS HERE FOR FOLLOW-UP TODAY  Elective cardioversion could not be for performed while he was in the hospital so he was placed on amiodarone.  Heart rates reasonably controlled but does go up in the 120s with walking little exercise..  No other symptoms.       3/16/22     TWO HUNDRED  JOULES SYNCHRONIZED CARDIOVERSION WAS APPLIED  HE RETURNED TO SINUS RHYTHM NO COMPLICATIONS     HE WAS NEUROLOGICALLY ALERT     IMPRESSION SUCCESSFUL CARDIOVERSION ATRIAL FIBRILLATION TO SINUS RHYTHM.    524/22    He is here for follow-up after her successful cardioversion with restoration of sinus rhythm.  He remains in sinus rhythm and has no complaints.  He has follows his rhythm with the Apple watch said no recurrence.  He did have some nausea with the medications digoxin Pacerone and stop these.  He would like to get off the blood thinner because of he bleeds more frequently.  It does stop after minute to 2.  Is no chest pain or shortness of breath.  Chads Vasc score equals 2.        Review of patient's allergies indicates:  No Known Allergies    Past Medical History:   Diagnosis Date    Atrial fibrillation with RVR 01/2022    COVID-19 01/2022    Hypertension      Past Surgical History:   Procedure Laterality Date    CARDIOVERSION  3/16/2022         SKIN CANCER EXCISION      TREATMENT OF CARDIAC ARRHYTHMIA N/A 3/16/2022    Procedure: Cardioversion/Defibrillation;  Surgeon: Aries Franklin MD;  Location: [facility] CATH/EP LAB;  Service: Cardiology;  Laterality: N/A;     Social History     Tobacco Use    Smoking status: Never Smoker    Smokeless tobacco: Never Used   Substance Use Topics    Alcohol use: Not Currently     Alcohol/week: 24.0 standard drinks     Types: 24 Cans of beer per week    Drug use: Never     Family History   Problem Relation Age of Onset    Hypertension Mother     Hypertension Father         Review of Systems:   Constitution: Negative for diaphoresis and fever.   HEENT: Negative for nosebleeds.    Cardiovascular: Negative for chest pain       No dyspnea on exertion       No leg swelling        No palpitations  Respiratory: Negative for shortness of breath and wheezing.    Hematologic/Lymphatic: Negative for bleeding problem. Does not bruise/bleed easily.   Skin: Negative for color  change and rash.   Musculoskeletal: Negative for falls and myalgias.   Gastrointestinal: Negative for hematemesis and hematochezia.   Genitourinary: Negative for hematuria.   Neurological: Negative for dizziness and light-headedness.   Psychiatric/Behavioral: Negative for altered mental status and memory loss.          Objective:        Vitals:    05/24/22 1201   BP: 118/84   Pulse: 63       Lab Results   Component Value Date    WBC 6.44 03/14/2022    HGB 14.9 03/14/2022    HCT 44.1 03/14/2022     03/14/2022    ALT 20 02/02/2022    AST 24 02/02/2022     (L) 03/14/2022    K 3.7 03/14/2022    CL 99 03/14/2022    CREATININE 1.1 03/14/2022    BUN 16 03/14/2022    CO2 29 03/14/2022    TSH 2.870 02/01/2022    INR 2.0 02/01/2022        ECHOCARDIOGRAM RESULTS  Results for orders placed during the hospital encounter of 01/05/22    Echo    Interpretation Summary  · The left ventricle is normal in size with normal systolic function.  · The estimated ejection fraction is 60%.  · Normal left ventricular diastolic function.  · There is abnormal septal wall motion. There is systolic and diastolic flattening of the interventricular septum consistent with right ventricle pressure and volume overload.  · Normal right ventricular size with mildly reduced right ventricular systolic function.  · Moderate left atrial enlargement.  · Mild right atrial enlargement.  · Mild tricuspid regurgitation.  · Elevated central venous pressure (15 mmHg).  · The estimated PA systolic pressure is 40 mmHg.        CURRENT/PREVIOUS VISIT EKG  Results for orders placed or performed during the hospital encounter of 03/16/22   EKG 12-lead    Collection Time: 03/16/22  9:21 AM    Narrative    Test Reason : Z01.89,    Vent. Rate : 060 BPM     Atrial Rate : 060 BPM     P-R Int : 188 ms          QRS Dur : 082 ms      QT Int : 456 ms       P-R-T Axes : 079 052 083 degrees     QTc Int : 456 ms    Normal sinus rhythm  Low voltage QRS  Septal infarct  (cited on or before 16-MAR-2022)  Abnormal ECG  When compared with ECG of 16-MAR-2022 09:20,  The axis Shifted left  Nonspecific T wave abnormality no longer evident in Lateral leads  Confirmed by Rigoberto BLOOM, Aries ELLIS ([id number]) on 3/17/2022 2:13:28 PM    Referred By:             Confirmed By:Aries Franklin MD     No valid procedures specified.   No results found for this or any previous visit.      Physical Exam:  CONSTITUTIONAL: No fever, no chills  HEENT: Normocephalic, atraumatic,pupils reactive to light                 NECK:  No JVD no carotid bruit  CVS: S1S2+, RRR, no murmurs,   LUNGS: Clear  ABDOMEN: Soft, NT, BS+  EXTREMITIES: No cyanosis, edema  : No brown catheter  NEURO: AAO X 3  PSY: Normal affect      Medication List with Changes/Refills   Current Medications    AMIODARONE (PACERONE) 200 MG TAB    Take 2 tablets by mouth twice daily    AMIODARONE (PACERONE) 200 MG TAB    Take 2 tablets (400 mg total) by mouth 2 (two) times daily.    AMLODIPINE (NORVASC) 10 MG TABLET    Take 10 mg by mouth once daily.    CYCLOBENZAPRINE (FLEXERIL) 5 MG TABLET    1 po hs for muscle spasms    DIGOXIN (LANOXIN) 125 MCG TABLET    Take 1 tablet (125 mcg total) by mouth once daily. 1 week    METHYLPREDNISOLONE (MEDROL DOSEPACK) 4 MG TABLET    use as directed    OLMESARTAN (BENICAR) 40 MG TABLET    Take 40 mg by mouth once daily.    OPW TEST CLAIM - DO NOT FILL    OPW test claim. Do not fill.    RIVAROXABAN (XARELTO) 20 MG TAB    Take 1 tablet (20 mg total) by mouth once daily.             Assessment:       1. Encounter for cardioversion procedure    2. Paroxysmal atrial fibrillation    3. COVID-19    4. Primary hypertension         Plan:     Problem List Items Addressed This Visit        Cardiac/Vascular    Atrial fibrillation    Primary hypertension       ID    COVID-19      Other Visit Diagnoses     Encounter for cardioversion procedure    -  Primary        The patient is doing well with no cardiac problems.  His atrial  fibrillation was caused by the COVID.  His chads Vasc score equals 2. His blood pressure is well controlled.  Currently it is recommend that we continue the Xarelto..      Follow up in about 6 months (around 11/24/2022).    The patients questions were answered, they verbalized understanding, and agreed with the treatment plan.     AGUSTÍN ANGEL MD  [facility]

## 2022-06-02 ENCOUNTER — TELEPHONE (OUTPATIENT)
Dept: CARDIOLOGY | Facility: CLINIC | Age: 67
End: 2022-06-02
Payer: MEDICARE

## 2022-06-02 NOTE — TELEPHONE ENCOUNTER
Fax from [facility] to clear for dental procedure (tooth extraction, then deep cleanings) and clearance to hold Xarelto.    Will need to print and fax to dental office.  Request scanned into chart.

## 2022-06-02 NOTE — LETTER
2022    Les Goldman  [address]  [place] [address]       [facility] - Cardiology  [address],   [place] [address]  Phone: [phone]  Fax: [phone] Patient: eLs Goldman  : 1955     Referring Doctor:[facility]  Telephone:[phone]  Fax: [phone]    Type of Procedure:  Tooth extraction, then deep cleaning  Date of Last Stent:  Date of Last Office Visit:2022      Current Outpatient Medications   Medication Sig    amLODIPine (NORVASC) 10 MG tablet Take 10 mg by mouth once daily.    cyclobenzaprine (FLEXERIL) 5 MG tablet 1 po hs for muscle spasms (Patient not taking: Reported on 2022)    olmesartan (BENICAR) 40 MG tablet Take 40 mg by mouth once daily.    OPW TEST CLAIM - DO NOT FILL OPW test claim. Do not fill. (Patient not taking: Reported on 2022)    rivaroxaban (XARELTO) 20 mg Tab Take 1 tablet (20 mg total) by mouth once daily.     No current facility-administered medications for this visit.       This patient has been assessed for risk factors for clearance of surgery with the following stipulations:    _X__ No contraindications    X    Recommendations for antiplatelet/anticoagulant medications: Patient may hold  Xarelto for 2 days prior to surgery.     X    Cleared for surgery with the following contraindications/precautions:    ___ Not cleared for surgery due to the following reasons:      If you have any questions regarding the above, please contact my office at [phone].    Sincerely,  Maria Victoria Naqvi PA-C   [facility] - Department of Cardiology  Office: [phone]

## 2022-10-06 ENCOUNTER — PATIENT MESSAGE (OUTPATIENT)
Dept: RESEARCH | Facility: HOSPITAL | Age: 67
End: 2022-10-06
Payer: MEDICARE

## 2023-02-16 ENCOUNTER — OFFICE VISIT (OUTPATIENT)
Dept: URGENT CARE | Facility: CLINIC | Age: 68
End: 2023-02-16
Payer: MEDICARE

## 2023-02-16 VITALS
DIASTOLIC BLOOD PRESSURE: 86 MMHG | BODY MASS INDEX: 20.9 KG/M2 | RESPIRATION RATE: 18 BRPM | TEMPERATURE: 98 F | HEIGHT: 70 IN | WEIGHT: 146 LBS | SYSTOLIC BLOOD PRESSURE: 124 MMHG | OXYGEN SATURATION: 98 % | HEART RATE: 76 BPM

## 2023-02-16 DIAGNOSIS — M70.21 OLECRANON BURSITIS OF RIGHT ELBOW: Primary | ICD-10-CM

## 2023-02-16 PROCEDURE — 99213 OFFICE O/P EST LOW 20 MIN: CPT | Mod: S$GLB,,, | Performed by: FAMILY MEDICINE

## 2023-02-16 PROCEDURE — 99213 PR OFFICE/OUTPT VISIT, EST, LEVL III, 20-29 MIN: ICD-10-PCS | Mod: S$GLB,,, | Performed by: FAMILY MEDICINE

## 2023-02-16 RX ORDER — NAPROXEN 500 MG/1
500 TABLET ORAL 2 TIMES DAILY WITH MEALS
Qty: 30 TABLET | Refills: 0 | Status: SHIPPED | OUTPATIENT
Start: 2023-02-16 | End: 2023-08-17

## 2023-02-16 NOTE — PROGRESS NOTES
"Subjective:       Patient ID: Les Goldman is a 67 y.o. male.    Vitals:  height is 5' 10" (1.778 m) and weight is 66.2 kg (146 lb). His oral temperature is 98 °F (36.7 °C). His blood pressure is 124/86 and his pulse is 76. His respiration is 18 and oxygen saturation is 98%.     Chief Complaint: Mass    This is a 67 y.o. male who presents today with a chief complaint of  lump on  right elbow,x 1 day    Mass  This is a new problem. The current episode started yesterday. The problem occurs constantly. The problem has been gradually worsening. Pertinent negatives include no abdominal pain, anorexia, arthralgias, change in bowel habit, chest pain, chills, congestion, coughing, diaphoresis, fatigue, fever, headaches, joint swelling, myalgias, nausea, neck pain, numbness, rash, sore throat, swollen glands, urinary symptoms, vertigo, visual change, vomiting or weakness. Nothing aggravates the symptoms. He has tried nothing for the symptoms. The treatment provided no relief.     Constitution: Negative for chills, sweating, fatigue and fever.   HENT:  Negative for congestion and sore throat.    Neck: Negative for neck pain.   Cardiovascular:  Negative for chest pain.   Respiratory:  Negative for cough.    Gastrointestinal:  Negative for abdominal pain, nausea and vomiting.   Musculoskeletal:  Negative for joint pain, joint swelling and muscle ache.   Skin:  Negative for rash.   Neurological:  Negative for history of vertigo, headaches and numbness.     Objective:      Physical Exam   Constitutional: He is oriented to person, place, and time. He appears well-developed. He is cooperative.  Non-toxic appearance. He does not appear ill. No distress.   HENT:   Head: Normocephalic and atraumatic.   Ears:   Right Ear: Hearing and external ear normal.   Left Ear: Hearing and external ear normal.   Nose: Nose normal. No mucosal edema, rhinorrhea or nasal deformity. No epistaxis. Right sinus exhibits no maxillary sinus tenderness " and no frontal sinus tenderness. Left sinus exhibits no maxillary sinus tenderness and no frontal sinus tenderness.   Mouth/Throat: Uvula is midline, oropharynx is clear and moist and mucous membranes are normal. No trismus in the jaw. Normal dentition. No uvula swelling. No oropharyngeal exudate, posterior oropharyngeal edema or posterior oropharyngeal erythema.   Eyes: Conjunctivae and lids are normal. No scleral icterus.   Neck: Trachea normal and phonation normal. Neck supple. No edema present. No erythema present. No neck rigidity present.   Cardiovascular: Normal rate, regular rhythm, normal heart sounds and normal pulses.   Pulmonary/Chest: Effort normal and breath sounds normal. No respiratory distress. He has no decreased breath sounds. He has no rhonchi.   Abdominal: Normal appearance and bowel sounds are normal. Soft.   Musculoskeletal: Normal range of motion.         General: No deformity. Normal range of motion.      Right elbow: He exhibits swelling. He exhibits normal range of motion, no effusion and no laceration. No tenderness found.   Neurological: He is alert and oriented to person, place, and time. He exhibits normal muscle tone. Coordination normal.   Skin: Skin is warm, dry, intact, not diaphoretic and not pale.   Psychiatric: His speech is normal and behavior is normal. Judgment and thought content normal.   Nursing note and vitals reviewed.      Assessment:       1. Olecranon bursitis of right elbow          Plan:         Olecranon bursitis of right elbow  -     naproxen (NAPROSYN) 500 MG tablet; Take 1 tablet (500 mg total) by mouth 2 (two) times daily with meals.  Dispense: 30 tablet; Refill: 0

## 2023-05-03 RX ORDER — RIVAROXABAN 20 MG/1
TABLET, FILM COATED ORAL
Qty: 30 TABLET | Refills: 0 | Status: SHIPPED | OUTPATIENT
Start: 2023-05-03 | End: 2023-06-05

## 2023-05-03 NOTE — TELEPHONE ENCOUNTER
----- Message from Calvin Teran sent at 5/3/2023  2:39 PM CDT -----  Contact: Self  Type:  RX Refill Request    Who Called:  Patient  Refill or New Rx:  Refill  RX Name and Strength:  rivaroxaban (XARELTO) 20 mg Tab  How is the patient currently taking it? (ex. 1XDay):  as directed  Is this a 30 day or 90 day RX:  90  Preferred Pharmacy with phone number:      [address] - [address] [id number] [facility] [address]  Phone: [phone] Fax: [phone]  Local or Mail Order:  Local  Ordering Provider:  Rigoberto Garcia Call Back Number:  [phone]   Additional Information:

## 2023-06-05 RX ORDER — RIVAROXABAN 20 MG/1
TABLET, FILM COATED ORAL
Qty: 30 TABLET | Refills: 0 | Status: SHIPPED | OUTPATIENT
Start: 2023-06-05 | End: 2023-07-06

## 2023-07-06 RX ORDER — RIVAROXABAN 20 MG/1
TABLET, FILM COATED ORAL
Qty: 30 TABLET | Refills: 0 | Status: SHIPPED | OUTPATIENT
Start: 2023-07-06 | End: 2023-08-10

## 2023-08-10 RX ORDER — RIVAROXABAN 20 MG/1
TABLET, FILM COATED ORAL
Qty: 30 TABLET | Refills: 0 | Status: SHIPPED | OUTPATIENT
Start: 2023-08-10 | End: 2023-08-17 | Stop reason: SDUPTHER

## 2023-08-10 NOTE — TELEPHONE ENCOUNTER
Please see the attached refill request.  Marcello is scheduled to see Rigoberto on 08/17/2023 but is completely out of her is blood thinner. Can you send in a 30 day supply

## 2023-08-17 ENCOUNTER — OFFICE VISIT (OUTPATIENT)
Dept: CARDIOLOGY | Facility: CLINIC | Age: 68
End: 2023-08-17
Payer: MEDICARE

## 2023-08-17 VITALS
HEART RATE: 70 BPM | SYSTOLIC BLOOD PRESSURE: 122 MMHG | HEIGHT: 70 IN | BODY MASS INDEX: 22.41 KG/M2 | DIASTOLIC BLOOD PRESSURE: 80 MMHG | OXYGEN SATURATION: 96 % | WEIGHT: 156.5 LBS

## 2023-08-17 DIAGNOSIS — U07.1 COVID-19: ICD-10-CM

## 2023-08-17 DIAGNOSIS — I48.0 PAROXYSMAL ATRIAL FIBRILLATION: Primary | ICD-10-CM

## 2023-08-17 DIAGNOSIS — I10 PRIMARY HYPERTENSION: ICD-10-CM

## 2023-08-17 DIAGNOSIS — Z79.01 ANTICOAGULATED: ICD-10-CM

## 2023-08-17 PROCEDURE — 93010 EKG 12-LEAD: ICD-10-PCS | Mod: S$PBB,,, | Performed by: GENERAL PRACTICE

## 2023-08-17 PROCEDURE — 99999 PR PBB SHADOW E&M-EST. PATIENT-LVL III: CPT | Mod: PBBFAC,,, | Performed by: GENERAL PRACTICE

## 2023-08-17 PROCEDURE — 99999 PR PBB SHADOW E&M-EST. PATIENT-LVL III: ICD-10-PCS | Mod: PBBFAC,,, | Performed by: GENERAL PRACTICE

## 2023-08-17 PROCEDURE — 99214 PR OFFICE/OUTPT VISIT, EST, LEVL IV, 30-39 MIN: ICD-10-PCS | Mod: S$PBB,,, | Performed by: GENERAL PRACTICE

## 2023-08-17 PROCEDURE — 93005 ELECTROCARDIOGRAM TRACING: CPT | Mod: PBBFAC,PN | Performed by: GENERAL PRACTICE

## 2023-08-17 PROCEDURE — 99214 OFFICE O/P EST MOD 30 MIN: CPT | Mod: S$PBB,,, | Performed by: GENERAL PRACTICE

## 2023-08-17 PROCEDURE — 99213 OFFICE O/P EST LOW 20 MIN: CPT | Mod: PBBFAC,PN | Performed by: GENERAL PRACTICE

## 2023-08-17 PROCEDURE — 93010 ELECTROCARDIOGRAM REPORT: CPT | Mod: S$PBB,,, | Performed by: GENERAL PRACTICE

## 2023-08-17 RX ORDER — AMLODIPINE BESYLATE 10 MG/1
10 TABLET ORAL DAILY
Qty: 30 TABLET | Refills: 11 | Status: SHIPPED | OUTPATIENT
Start: 2023-08-17 | End: 2024-08-16

## 2023-08-17 RX ORDER — OLMESARTAN MEDOXOMIL 40 MG/1
40 TABLET ORAL DAILY
Qty: 90 TABLET | Refills: 3 | Status: SHIPPED | OUTPATIENT
Start: 2023-08-17 | End: 2024-08-16

## 2023-08-17 NOTE — PROGRESS NOTES
Subjective:    Patient ID:  Les Goldman is a 67 y.o. male who presents for follow-up of No chief complaint on file.      HPI:    8/17/23    Here for followup.  No more atrial fibrillation.        Wants to get off xarelto., but willing to take it if necessary. Needs refills.         Palpitations       Started this morning. Current heart rate is 162 bpm. Denies chest pain      Patient with recent diagnosis of COVID-19 infection associated with new onset atrial fibrillation with RVR.  Patient went to his dermatologist appointment and noted his heart rate was 170. Patient continue with some mild fatigue after his COVID infection.  His no fever chills.  No shortness of breath.  No pleurisy hemoptysis.  Patient was not aware he was so tachycardic.   ERV  66-year-old male with history of hypertension.  Patient presents emergency department with complaint of COVID like symptoms since 12/31.  Patient states has had nonproductive cough, headache, diarrhea with associated shortness of breath.  Patient was seen at [facility]"" urgent care earlier today and had tested positive for COVID.  While at urgent care patient found to have atrial fibrillation rapid ventricular response with rates in the 170s was told to come to the emergency department further evaluation treatment.  Patient denied chest pain, no nausea, vomiting, no other constitutional symptoms.           History is from the nurse and the chart.  Patient was previously healthy  but having some shortness of breath for more than a week.  He is now admitted with atrial fibrillation rapid response.  His rate was controlled with 80s on Cardizem drip but this was continue this morning about 4:00 a.m..  His heart rate is currently starting to elevate again up to the 140s to 160 when he moves around.        HE IS HERE FOR FOLLOW-UP TODAY  Elective cardioversion could not be for performed while he was in the hospital so he was placed on amiodarone.  Heart rates reasonably  controlled but does go up in the 120s with walking little exercise..  No other symptoms.        3/16/22      TWO HUNDRED JOULES SYNCHRONIZED CARDIOVERSION WAS APPLIED  HE RETURNED TO SINUS RHYTHM NO COMPLICATIONS     HE WAS NEUROLOGICALLY ALERT     IMPRESSION SUCCESSFUL CARDIOVERSION ATRIAL FIBRILLATION TO SINUS RHYTHM.     524/22     He is here for follow-up after her successful cardioversion with restoration of sinus rhythm.  He remains in sinus rhythm and has no complaints.  He has follows his rhythm with the Apple watch said no recurrence.  He did have some nausea with the medications digoxin Pacerone and stop these.  He would like to get off the blood thinner because of he bleeds more frequently.  It does stop after minute to 2.  Is no chest pain or shortness of breath.  Chads Vasc score equals 2.        Review of patient's allergies indicates:  No Known Allergies    Past Medical History:   Diagnosis Date    Atrial fibrillation with RVR 01/2022    COVID-19 01/2022    Hypertension      Past Surgical History:   Procedure Laterality Date    CARDIOVERSION  3/16/2022         SKIN CANCER EXCISION      TREATMENT OF CARDIAC ARRHYTHMIA N/A 3/16/2022    Procedure: Cardioversion/Defibrillation;  Surgeon: Aries Franklin MD;  Location: [facility] CATH/EP LAB;  Service: Cardiology;  Laterality: N/A;     Social History     Tobacco Use    Smoking status: Never    Smokeless tobacco: Never   Substance Use Topics    Alcohol use: Not Currently     Alcohol/week: 24.0 standard drinks of alcohol     Types: 24 Cans of beer per week    Drug use: Never     Family History   Problem Relation Age of Onset    Hypertension Mother     Hypertension Father         Review of Systems:   Constitution: Negative for diaphoresis and fever.   HEENT: Negative for nosebleeds.    Cardiovascular: Negative for chest pain       No dyspnea on exertion       No leg swelling        No palpitations  Respiratory: Negative for shortness of breath and wheezing.     Hematologic/Lymphatic: Negative for bleeding problem. Does not bruise/bleed easily.   Skin: Negative for color change and rash.   Musculoskeletal: Negative for falls and myalgias.   Gastrointestinal: Negative for hematemesis and hematochezia.   Genitourinary: Negative for hematuria.   Neurological: Negative for dizziness and light-headedness.   Psychiatric/Behavioral: Negative for altered mental status and memory loss.          Objective:        There were no vitals filed for this visit.    Lab Results   Component Value Date    WBC 6.44 03/14/2022    HGB 14.9 03/14/2022    HCT 44.1 03/14/2022     03/14/2022    ALT 20 02/02/2022    AST 24 02/02/2022     (L) 03/14/2022    K 3.7 03/14/2022    CL 99 03/14/2022    CREATININE 1.1 03/14/2022    BUN 16 03/14/2022    CO2 29 03/14/2022    TSH 2.870 02/01/2022    INR 2.0 02/01/2022        ECHOCARDIOGRAM RESULTS  Results for orders placed during the hospital encounter of 01/05/22    Echo    Interpretation Summary  · The left ventricle is normal in size with normal systolic function.  · The estimated ejection fraction is 60%.  · Normal left ventricular diastolic function.  · There is abnormal septal wall motion. There is systolic and diastolic flattening of the interventricular septum consistent with right ventricle pressure and volume overload.  · Normal right ventricular size with mildly reduced right ventricular systolic function.  · Moderate left atrial enlargement.  · Mild right atrial enlargement.  · Mild tricuspid regurgitation.  · Elevated central venous pressure (15 mmHg).  · The estimated PA systolic pressure is 40 mmHg.        CURRENT/PREVIOUS VISIT EKG  Results for orders placed or performed in visit on 05/24/22   IN OFFICE EKG 12-LEAD (to [place])    Collection Time: 05/24/22 12:05 PM    Narrative    Test Reason : Z01.89,I48.0,U07.1,I10,    Vent. Rate : 064 BPM     Atrial Rate : 064 BPM     P-R Int : 156 ms          QRS Dur : 086 ms      QT Int : 458 ms        P-R-T Axes : 089 075 072 degrees     QTc Int : 472 ms    Normal sinus rhythm  Normal ECG  When compared with ECG of 16-MAR-2022 09:21,  ST no longer depressed in Anterior leads  Nonspecific T wave abnormality no longer evident in Anterior leads  Confirmed by Rigoberto BLOOM, Aries ELLIS ([id number]) on 6/28/2022 6:14:19 PM    Referred By:  RUKHSANA FRANKLIN           Confirmed By:Aries Franklin MD     No valid procedures specified.   No results found for this or any previous visit.      Physical Exam:  CONSTITUTIONAL: No fever, no chills  HEENT: Normocephalic, atraumatic,pupils reactive to light                 NECK:  No JVD no carotid bruit  CVS: S1S2+, RRR, no murmurs,   LUNGS: Clear  ABDOMEN: Soft, NT, BS+  EXTREMITIES: No cyanosis, edema  : No brown catheter  NEURO: AAO X 3  PSY: Normal affect      Medication List with Changes/Refills   Current Medications    AMLODIPINE (NORVASC) 10 MG TABLET    Take 10 mg by mouth once daily.    CYCLOBENZAPRINE (FLEXERIL) 5 MG TABLET    1 po hs for muscle spasms    NAPROXEN (NAPROSYN) 500 MG TABLET    Take 1 tablet (500 mg total) by mouth 2 (two) times daily with meals.    OLMESARTAN (BENICAR) 40 MG TABLET    Take 40 mg by mouth once daily.    OPW TEST CLAIM - DO NOT FILL    OPW test claim. Do not fill.    XARELTO 20 MG TAB    TAKE 1 TABLET BY MOUTH ONCE DAILY . APPOINTMENT REQUIRED FOR FUTURE REFILLS             Assessment:       1. Paroxysmal atrial fibrillation    2. Primary hypertension    3. COVID-19    4. Anticoagulated         Plan:     Problem List Items Addressed This Visit          Cardiac/Vascular    Atrial fibrillation - Primary    Primary hypertension       ID    COVID-19     Other Visit Diagnoses       Anticoagulated              Recommend contiuing xarelto.  His original episode of atrial fibrillation was attributed to active COVID infection plus he got vaccine in the middle of the infection.  He is not had any more symptoms but we do not know the long-term risk after having  COVID there may still be a chance of recurrence of asymptomatic AFib and we decided to renewed Xarelto and continue the same      No follow-ups on file.    The patients questions were answered, they verbalized understanding, and agreed with the treatment plan.     AGUSTÍN ANGEL MD  [facility]